# Patient Record
Sex: FEMALE | Race: WHITE | Employment: FULL TIME | URBAN - METROPOLITAN AREA
[De-identification: names, ages, dates, MRNs, and addresses within clinical notes are randomized per-mention and may not be internally consistent; named-entity substitution may affect disease eponyms.]

---

## 2017-01-06 ENCOUNTER — OFFICE VISIT (OUTPATIENT)
Dept: FAMILY MEDICINE | Facility: CLINIC | Age: 27
End: 2017-01-06
Payer: COMMERCIAL

## 2017-01-06 VITALS
BODY MASS INDEX: 20.73 KG/M2 | WEIGHT: 117 LBS | DIASTOLIC BLOOD PRESSURE: 75 MMHG | TEMPERATURE: 99.6 F | HEART RATE: 86 BPM | HEIGHT: 63 IN | SYSTOLIC BLOOD PRESSURE: 114 MMHG | RESPIRATION RATE: 14 BRPM

## 2017-01-06 DIAGNOSIS — B37.31 CANDIDIASIS OF VULVA AND VAGINA: Primary | ICD-10-CM

## 2017-01-06 DIAGNOSIS — N89.8 VAGINAL DISCHARGE: ICD-10-CM

## 2017-01-06 LAB
MICRO REPORT STATUS: NORMAL
SPECIMEN SOURCE: NORMAL
WET PREP SPEC: NORMAL

## 2017-01-06 PROCEDURE — 87210 SMEAR WET MOUNT SALINE/INK: CPT | Performed by: NURSE PRACTITIONER

## 2017-01-06 PROCEDURE — 99213 OFFICE O/P EST LOW 20 MIN: CPT | Performed by: NURSE PRACTITIONER

## 2017-01-06 RX ORDER — TERCONAZOLE 8 MG/G
1 CREAM VAGINAL AT BEDTIME
Qty: 20 G | Refills: 2 | Status: SHIPPED | OUTPATIENT
Start: 2017-01-06 | End: 2017-08-08

## 2017-01-06 NOTE — PROGRESS NOTES
SUBJECTIVE:                                                    Faustina Lubin is a 26 year old female who presents to clinic today for the following health issues:      Vaginal Symptoms      Duration: 1 week    Description  vaginal discharge - white clear, itching, burning and odor    Intensity:  moderate    Accompanying signs and symptoms (fever/dysuria/abdominal or back pain): None    History  Sexually active: yes, single partner, contraception - oral contraceptives (combined)  Possibility of pregnancy: No  Recent antibiotic use: no     Precipitating or alleviating factors: None    Therapies tried and outcome: none            Problem list and histories reviewed & adjusted, as indicated.    Additional history:   Past week has excess white vaginal discharge. Vagina is sore. No pelvic pains or urinary symptoms. Periods regular. Takes BCP. Lives with BF.   Had same symptoms 10/2016. Terazol cream worked well.       Patient Active Problem List   Diagnosis     Fibrocystic breast changes     CARDIOVASCULAR SCREENING; LDL GOAL LESS THAN 160     Headache     Cervicalgia     Mild single current episode of major depressive disorder (H)     Oral contraceptive pill surveillance     Past Surgical History   Procedure Laterality Date     No history of surgery         Social History   Substance Use Topics     Smoking status: Never Smoker      Smokeless tobacco: Never Used     Alcohol Use: 0.0 - 0.6 oz/week     0-1 Standard drinks or equivalent per week      Comment: less than one per week      Family History   Problem Relation Age of Onset     CANCER Maternal Grandmother      ovary and uterine, age 76     Hypertension Maternal Grandmother          Current Outpatient Prescriptions   Medication Sig Dispense Refill     terconazole (TERAZOL 3) 0.8 % cream Place 1 applicator (1 g) vaginally At Bedtime 20 g 2     JULEBER 0.15-30 MG-MCG per tablet TAKE ONE TABLET BY MOUTH ONCE DAILY 84 tablet 3     melatonin (MELATONIN) 1 MG/ML  "LIQD liquid Take 1 mg by mouth nightly as needed for sleep       Multiple Vitamins-Minerals (MULTIVITAMIN PO) Take by mouth daily         ROS:  C: NEGATIVE for fever, chills, change in weight  E/M: NEGATIVE for ear, mouth and throat problems  R: NEGATIVE for significant cough or SOB  CV: NEGATIVE for chest pain, palpitations or peripheral edema    OBJECTIVE:                                                    /75 mmHg  Pulse 86  Temp(Src) 99.6  F (37.6  C) (Tympanic)  Resp 14  Ht 5' 3\" (1.6 m)  Wt 117 lb (53.071 kg)  BMI 20.73 kg/m2  LMP 12/16/2016 (Approximate)  Body mass index is 20.73 kg/(m^2).   GENERAL: healthy, alert, well nourished, well hydrated, no distress  RESP: lungs clear to auscultation - no rales, no rhonchi, no wheezes  CV: regular rates and rhythm, normal S1 S2, no S3 or S4 and no murmur, no click or rub    ABDOMEN: soft, no tenderness, no  hepatosplenomegaly, no masses, normal bowel sounds  EXTERNAL GENITALIA: patchy red skin of labial tissues, no lesions.  VAGINA: small amount white discharge    Results for orders placed or performed in visit on 01/06/17   Wet prep   Result Value Ref Range    Specimen Description Vagina     Wet Prep       No clue cells seen  No Trichomonas seen  No yeast seen      Micro Report Status FINAL 01/06/2017               ASSESSMENT/PLAN:                                                        ICD-10-CM    1. Candidiasis of vulva and vagina B37.3    2. Vaginal discharge N89.8 Wet prep       Discussed condition and symptomatic cares. Feminine hygiene practices   I have discussed with patient the risks, benefits, medications, treatment options and modalities.  terconazole (TERAZOL 3) 0.8 % cream  Follow up if not improving as expected.      JORGE L Liu [facility]      "

## 2017-01-06 NOTE — NURSING NOTE
"Chief Complaint   Patient presents with     Vaginal Problem       Initial /75 mmHg  Pulse 86  Temp(Src) 99.6  F (37.6  C) (Tympanic)  Resp 14  Ht 5' 3\" (1.6 m)  Wt 117 lb (53.071 kg)  BMI 20.73 kg/m2  LMP 12/16/2016 (Approximate) Estimated body mass index is 20.73 kg/(m^2) as calculated from the following:    Height as of this encounter: 5' 3\" (1.6 m).    Weight as of this encounter: 117 lb (53.071 kg).  BP completed using cuff size: alok Vaca CMA      "

## 2017-01-06 NOTE — MR AVS SNAPSHOT
"              After Visit Summary   2017    Faustina Lubin    MRN: [id number]           Patient Information     Date Of Birth          1990        Visit Information        Provider Department      2017 4:00 PM Greer Chandler APRN CNP [facility]        Today's Diagnoses     Candidiasis of vulva and vagina    -  1     Vaginal discharge            Follow-ups after your visit        Who to contact     If you have questions or need follow up information about today's clinic visit or your schedule please contact [facility] directly at [phone].  Normal or non-critical lab and imaging results will be communicated to you by [employer], letter or phone within 4 business days after the clinic has received the results. If you do not hear from us within 7 days, please contact the clinic through [employer] or phone. If you have a critical or abnormal lab result, we will notify you by phone as soon as possible.  Submit refill requests through [employer] or call your pharmacy and they will forward the refill request to us. Please allow 3 business days for your refill to be completed.          Additional Information About Your Visit        MyChart Information     [employer] lets you send messages to your doctor, view your test results, renew your prescriptions, schedule appointments and more. To sign up, go to [email or web address] . Click on \"Log in\" on the left side of the screen, which will take you to the Welcome page. Then click on \"Sign up Now\" on the right side of the page.     You will be asked to enter the access code listed below, as well as some personal information. Please follow the directions to create your username and password.     Your access code is: [email or web address]  Expires: 2017  5:52 PM     Your access code will  in 90 days. If you need help or a new code, please call your [facility] or [phone].        Care EveryWhere ID     This is your Care " "EveryWhere ID. This could be used by other organizations to access your [facility] medical records  [id number]        Your Vitals Were     Pulse Temperature Respirations Height BMI (Body Mass Index) Last Period    86 99.6  F (37.6  C) (Tympanic) 14 5' 3\" (1.6 m) 20.73 kg/m2 12/16/2016 (Approximate)       Blood Pressure from Last 3 Encounters:   01/06/17 114/75   10/04/16 110/60   09/26/16 100/68    Weight from Last 3 Encounters:   01/06/17 117 lb (53.071 kg)   10/04/16 119 lb (53.978 kg)   09/26/16 117 lb (53.071 kg)              We Performed the Following     Wet prep          Today's Medication Changes          These changes are accurate as of: 1/6/17 11:59 PM.  If you have any questions, ask your nurse or doctor.               Start taking these medicines.        Dose/Directions    terconazole 0.8 % cream   Commonly known as:  TERAZOL 3   Started by:  Greer Chandler APRN CNP        Dose:  1 applicator   Place 1 applicator (1 g) vaginally At Bedtime   Quantity:  20 g   Refills:  2         Stop taking these medicines if you haven't already. Please contact your care team if you have questions.     buPROPion 150 MG 12 hr tablet   Commonly known as:  WELLBUTRIN SR   Stopped by:  Greer Chandler APRN CNP                Where to get your medicines      These medications were sent to [facility] [place] [address]     Phone:  [phone]    - terconazole 0.8 % cream             Primary Care Provider Office Phone # Fax #    JORGE L Liu [phone]       [address]  [place] [address]        Thank you!     Thank you for choosing [facility]  for your care. Our goal is always to provide you with excellent care. Hearing back from our patients is one way we can continue to improve our services. Please take a few minutes to complete the written survey that you may " receive in the mail after your visit with us. Thank you!             Your Updated Medication List - Protect others around you: Learn how to safely use, store and throw away your medicines at www.disposemymeds.org.          This list is accurate as of: 1/6/17 11:59 PM.  Always use your most recent med list.                   Brand Name Dispense Instructions for use    JULEBER 0.15-30 MG-MCG per tablet   Generic drug:  desogestrel-ethinyl estradiol     84 tablet    TAKE ONE TABLET BY MOUTH ONCE DAILY       melatonin 1 MG/ML Liqd liquid      Take 1 mg by mouth nightly as needed for sleep       MULTIVITAMIN PO      Take by mouth daily       terconazole 0.8 % cream    TERAZOL 3    20 g    Place 1 applicator (1 g) vaginally At Bedtime

## 2017-03-21 ENCOUNTER — TELEPHONE (OUTPATIENT)
Dept: FAMILY MEDICINE | Facility: CLINIC | Age: 27
End: 2017-03-21

## 2017-03-21 NOTE — TELEPHONE ENCOUNTER
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  States previously had some tests done and was found to have tachycardia.  Wondering if it is ok to proceed with training.  See echo and notes back from 2012 regarding.  She is unaware if she ever saw anyone from cardiology or what was determined fully.    Please advise,  Leilani Cowan RN  [employer] [employer]

## 2017-03-22 NOTE — TELEPHONE ENCOUNTER
Left detailed message with below information.    Stephanie Gonzalez RN  [facility]  [phone]

## 2017-03-22 NOTE — TELEPHONE ENCOUNTER
She should be able to do the tazer training.  She had normal EKG and heart ECHO.   Thr time her pulse was checked it was normal.

## 2017-08-08 ENCOUNTER — OFFICE VISIT (OUTPATIENT)
Dept: FAMILY MEDICINE | Facility: CLINIC | Age: 27
End: 2017-08-08
Payer: COMMERCIAL

## 2017-08-08 VITALS
SYSTOLIC BLOOD PRESSURE: 100 MMHG | HEIGHT: 63 IN | TEMPERATURE: 98.7 F | DIASTOLIC BLOOD PRESSURE: 60 MMHG | HEART RATE: 64 BPM | WEIGHT: 115 LBS | RESPIRATION RATE: 16 BRPM | OXYGEN SATURATION: 100 % | BODY MASS INDEX: 20.38 KG/M2

## 2017-08-08 DIAGNOSIS — R20.2 PARESTHESIAS: ICD-10-CM

## 2017-08-08 DIAGNOSIS — Z00.00 ENCOUNTER FOR ROUTINE ADULT HEALTH EXAMINATION WITHOUT ABNORMAL FINDINGS: Primary | ICD-10-CM

## 2017-08-08 DIAGNOSIS — Z30.41 ORAL CONTRACEPTIVE PILL SURVEILLANCE: ICD-10-CM

## 2017-08-08 PROCEDURE — 99395 PREV VISIT EST AGE 18-39: CPT | Performed by: PHYSICIAN ASSISTANT

## 2017-08-08 PROCEDURE — 99212 OFFICE O/P EST SF 10 MIN: CPT | Mod: 25 | Performed by: PHYSICIAN ASSISTANT

## 2017-08-08 RX ORDER — DESOGESTREL AND ETHINYL ESTRADIOL 0.15-0.03
1 KIT ORAL DAILY
Qty: 84 TABLET | Refills: 3 | Status: SHIPPED | OUTPATIENT
Start: 2017-08-08 | End: 2018-10-01

## 2017-08-08 NOTE — PROGRESS NOTES
"Chief Complaint   Patient presents with     Physical       Initial /60  Pulse 64  Temp 98.7  F (37.1  C)  Resp 16  Ht 5' 3\" (1.6 m)  Wt 115 lb (52.2 kg)  LMP 07/25/2017 (Approximate)  SpO2 100%  BMI 20.37 kg/m2 Estimated body mass index is 20.37 kg/(m^2) as calculated from the following:    Height as of this encounter: 5' 3\" (1.6 m).    Weight as of this encounter: 115 lb (52.2 kg).  Medication Reconciliation: complete. DAHIANA Heck LPN       SUBJECTIVE:   CC: Faustina Lubin is an 26 year old woman who presents for preventive health visit.     Healthy Habits:    Do you get at least three servings of calcium containing foods daily (dairy, green leafy vegetables, etc.)? yes    Amount of exercise or daily activities, outside of work: 1 day(s) per week    Problems taking medications regularly No    Medication side effects: No    Have you had an eye exam in the past two years? yes    Do you see a dentist twice per year? yes    Do you have sleep apnea, excessive snoring or daytime drowsiness?no    Occasional right side of breast soreness. Unsure if related to period?  Both tips of toes have occasional tingling and a little sore. 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       -------------------------------------    Today's PHQ-2 Score: PHQ-2 ( 1999 Pfizer) 8/8/2017 1/6/2017   Q1: Little interest or pleasure in doing things 0 0   Q2: Feeling down, depressed or hopeless 0 0   PHQ-2 Score 0 0         Abuse: Current or Past(Physical, Sexual or Emotional)- No  Do you feel safe in your environment - Yes  Social History   Substance Use Topics     Smoking status: Never Smoker     Smokeless tobacco: Never Used     Alcohol use 0.0 - 0.6 oz/week     0 - 1 Standard drinks or equivalent per week      Comment: less than one per week      The patient does not drink >3 drinks per day nor >7 drinks per week.    Reviewed orders with patient.  Reviewed health maintenance and updated orders " accordingly - Yes            Labs reviewed in EPIC  Patient Active Problem List   Diagnosis     Fibrocystic breast changes     CARDIOVASCULAR SCREENING; LDL GOAL LESS THAN 160     Headache     Cervicalgia     Mild single current episode of major depressive disorder (H)     Oral contraceptive pill surveillance     Past Surgical History:   Procedure Laterality Date     NO HISTORY OF SURGERY         Social History   Substance Use Topics     Smoking status: Never Smoker     Smokeless tobacco: Never Used     Alcohol use 0.0 - 0.6 oz/week     0 - 1 Standard drinks or equivalent per week      Comment: less than one per week      Family History   Problem Relation Age of Onset     CANCER Maternal Grandmother      ovary and uterine, age 76     Hypertension Maternal Grandmother      DIABETES Paternal Grandfather      Breast Cancer No family hx of      Colon Cancer No family hx of      Myocardial Infarction No family hx of          Current Outpatient Prescriptions   Medication Sig Dispense Refill     desogestrel-ethinyl estradiol (JULEBER) 0.15-30 MG-MCG per tablet Take 1 tablet by mouth daily 84 tablet 3     Multiple Vitamins-Minerals (MULTIVITAMIN PO) Take by mouth daily       [DISCONTINUED] JULEBER 0.15-30 MG-MCG per tablet TAKE ONE TABLET BY MOUTH ONCE DAILY 84 tablet 3     Allergies   Allergen Reactions     No Known Drug Allergies          Mammogram not appropriate for this patient based on age.    Pertinent mammograms are reviewed under the imaging tab.  History of abnormal Pap smear: NO - age 21-29 PAP every 3 years recommended    Reviewed and updated as needed this visit by clinical staffTobacco  Allergies  Meds  Fam Hx  Soc Hx        Reviewed and updated as needed this visit by Provider              ROS:  C: NEGATIVE for fever, chills, change in weight  I: NEGATIVE for worrisome rashes, moles or lesions  E: NEGATIVE for vision changes or irritation  ENT: NEGATIVE for ear, mouth and throat problems  R: NEGATIVE for  "significant cough or SOB  B: NEGATIVE for masses, tenderness or discharge  CV: NEGATIVE for chest pain, palpitations or peripheral edema  GI: NEGATIVE for nausea, abdominal pain, heartburn, or change in bowel habits  : NEGATIVE for unusual urinary or vaginal symptoms. Periods are regular.  M: NEGATIVE for significant arthralgias or myalgia  N: see above.   P: NEGATIVE for changes in mood or affect    OBJECTIVE:   /60  Pulse 64  Temp 98.7  F (37.1  C)  Resp 16  Ht 5' 3\" (1.6 m)  Wt 115 lb (52.2 kg)  LMP 07/25/2017 (Approximate)  SpO2 100%  BMI 20.37 kg/m2  EXAM:  GENERAL: healthy, alert and no distress  EYES: Eyes grossly normal to inspection, PERRL and conjunctivae and sclerae normal  HENT: ear canals and TM's normal, nose and mouth without ulcers or lesions  NECK: no adenopathy, no asymmetry, masses, or scars and thyroid normal to palpation  RESP: lungs clear to auscultation - no rales, rhonchi or wheezes  BREAST: normal without masses, tenderness or nipple discharge and no palpable axillary masses or adenopathy  CV: regular rate and rhythm, normal S1 S2, no S3 or S4, no murmur, click or rub, no peripheral edema and peripheral pulses strong  ABDOMEN: soft, nontender, no hepatosplenomegaly, no masses and bowel sounds normal  MS: no gross musculoskeletal defects noted, no edema  SKIN: no suspicious lesions or rashes  NEURO: Normal strength and tone, mentation intact and speech normal  PSYCH: mentation appears normal, affect normal/bright    ASSESSMENT/PLAN:   Faustina was seen today for physical.    Diagnoses and all orders for this visit:    Encounter for routine adult health examination without abnormal findings  Declines labs.     Oral contraceptive pill surveillance  -     desogestrel-ethinyl estradiol (JULEBER) 0.15-30 MG-MCG per tablet; Take 1 tablet by mouth daily    Paresthesias  Exam normal. Able to detect light and sharp sensation.  Likely from pressure from her boots. Says they are part of " "her uniform, so she can't change them, will ask though.   Recommend trying different socks, lacing looser, mole skin padding.   Return if worsening.     Breast exam normal. Recommend monitoring and symptom diary.     COUNSELING:   Reviewed preventive health counseling, as reflected in patient instructions       Regular exercise       Healthy diet/nutrition       Vision screening       Contraception       Family planning       Safe sex practices/STD prevention    BP Screening:   Last 3 BP Readings:    BP Readings from Last 3 Encounters:   08/08/17 100/60   01/06/17 114/75   10/04/16 110/60       The following was recommended to the patient:  Re-screen BP within a year and recommended lifestyle modifications     reports that she has never smoked. She has never used smokeless tobacco.    Estimated body mass index is 20.37 kg/(m^2) as calculated from the following:    Height as of this encounter: 5' 3\" (1.6 m).    Weight as of this encounter: 115 lb (52.2 kg).         Counseling Resources:  ATP IV Guidelines  Pooled Cohorts Equation Calculator  Breast Cancer Risk Calculator  FRAX Risk Assessment  ICSI Preventive Guidelines  Dietary Guidelines for Americans, 2010  USDA's MyPlate  ASA Prophylaxis  Lung CA Screening    Analy Oro PA-C  [facility] [place]  "

## 2017-08-08 NOTE — MR AVS SNAPSHOT
After Visit Summary   8/8/2017    Faustina Lubin    MRN: [id number]           Patient Information     Date Of Birth          1990        Visit Information        Provider Department      8/8/2017 11:20 AM Analy Oro PA-C [facility] [place]        Today's Diagnoses     Oral contraceptive pill surveillance          Care Instructions      Preventive Health Recommendations  Female Ages 18 to 25     Yearly exam:     See your health care provider every year in order to  o Review health changes.   o Discuss preventive care.    o Review your medicines if your doctor has prescribed any.      You should be tested each year for STDs (sexually transmitted diseases).       After age 20, talk to your provider about how often you should have cholesterol testing.      Starting at age 21, get a Pap test every three years. If you have an abnormal result, your doctor may have you test more often.      If you are at risk for diabetes, you should have a diabetes test (fasting glucose).     Shots:     Get a flu shot each year.     Get a tetanus shot every 10 years.     Consider getting the shot (vaccine) that prevents cervical cancer (Gardasil).    Nutrition:     Eat at least 5 servings of fruits and vegetables each day.    Eat whole-grain bread, whole-wheat pasta and brown rice instead of white grains and rice.    Talk to your provider about Calcium and Vitamin D.     Lifestyle    Exercise at least 150 minutes a week each week (30 minutes a day, 5 days a week). This will help you control your weight and prevent disease.    Limit alcohol to one drink per day.    No smoking.     Wear sunscreen to prevent skin cancer.    See your dentist every six months for an exam and cleaning.          Follow-ups after your visit        Who to contact     If you have questions or need follow up information about today's clinic visit or your schedule please contact [facility] [place] directly at  "[phone].  Normal or non-critical lab and imaging results will be communicated to you by MyChart, letter or phone within 4 business days after the clinic has received the results. If you do not hear from us within 7 days, please contact the clinic through [employer] or phone. If you have a critical or abnormal lab result, we will notify you by phone as soon as possible.  Submit refill requests through [employer] or call your pharmacy and they will forward the refill request to us. Please allow 3 business days for your refill to be completed.          Additional Information About Your Visit        [employer] Information     [employer] lets you send messages to your doctor, view your test results, renew your prescriptions, schedule appointments and more. To sign up, go to [email or web address] . Click on \"Log in\" on the left side of the screen, which will take you to the Welcome page. Then click on \"Sign up Now\" on the right side of the page.     You will be asked to enter the access code listed below, as well as some personal information. Please follow the directions to create your username and password.     Your access code is: [email or web address]  Expires: 2017 11:50 AM     Your access code will  in 90 days. If you need help or a new code, please call your [facility] clinic or [phone].        Care EveryWhere ID     This is your Care EveryWhere ID. This could be used by other organizations to access your [facility] medical records  [id number]        Your Vitals Were     Pulse Temperature Respirations Height Last Period Pulse Oximetry    64 98.7  F (37.1  C) 16 5' 3\" (1.6 m) 2017 (Approximate) 100%    BMI (Body Mass Index)                   20.37 kg/m2            Blood Pressure from Last 3 Encounters:   17 100/60   17 114/75   10/04/16 110/60    Weight from Last 3 Encounters:   17 115 lb (52.2 kg)   17 117 lb (53.1 kg)   10/04/16 119 lb (54 kg)              Today, you had the following  "    No orders found for display         Today's Medication Changes          These changes are accurate as of: 8/8/17 11:50 AM.  If you have any questions, ask your nurse or doctor.               These medicines have changed or have updated prescriptions.        Dose/Directions    desogestrel-ethinyl estradiol 0.15-30 MG-MCG per tablet   Commonly known as:  LIANNE   This may have changed:  See the new instructions.   Used for:  Oral contraceptive pill surveillance   Changed by:  Analy Oro PA-C        Dose:  1 tablet   Take 1 tablet by mouth daily   Quantity:  84 tablet   Refills:  3            Where to get your medicines      These medications were sent to [id number], [place] - [address]   [address] [place] [address], [facility] [address]     Phone:  [phone]     desogestrel-ethinyl estradiol 0.15-30 MG-MCG per tablet                Primary Care Provider Office Phone # Fax #    Greer Chandler, JORGE L [phone]       [address]  [place] [address]        Equal Access to Services     [facility] AH: Hadii aad ku hadasho Soomaali, waaxda luqadaha, qaybta kaalmada adeegyada, waxay idiin hayaan [place] sauerarajaquan lacortes ah. So [facility] [phone].    ATENCIÓN: Si habla español, tiene a lopez disposición servicios gratuitos de asistencia lingüística. Ema al [phone].    We comply with applicable federal civil rights laws and [place] laws. We do not discriminate on the basis of race, color, national origin, age, disability sex, sexual orientation or gender identity.            Thank you!     Thank you for choosing [facility] [place]  for your care. Our goal is always to provide you with excellent care. Hearing back from our patients is one way we can continue to improve our services. Please take a few minutes to complete the written survey that you may receive in the mail after your visit with us. Thank  you!             Your Updated Medication List - Protect others around you: Learn how to safely use, store and throw away your medicines at www.disposemymeds.org.          This list is accurate as of: 8/8/17 11:50 AM.  Always use your most recent med list.                   Brand Name Dispense Instructions for use Diagnosis    desogestrel-ethinyl estradiol 0.15-30 MG-MCG per tablet    JULEBER    84 tablet    Take 1 tablet by mouth daily    Oral contraceptive pill surveillance       MULTIVITAMIN PO      Take by mouth daily

## 2017-11-06 DIAGNOSIS — Z30.41 ORAL CONTRACEPTIVE PILL SURVEILLANCE: ICD-10-CM

## 2017-11-06 RX ORDER — DESOGESTREL AND ETHINYL ESTRADIOL 0.15-0.03
1 KIT ORAL DAILY
Qty: 84 TABLET | Refills: 3 | Status: CANCELLED | OUTPATIENT
Start: 2017-11-06

## 2017-12-11 ENCOUNTER — OFFICE VISIT (OUTPATIENT)
Dept: FAMILY MEDICINE | Facility: CLINIC | Age: 27
End: 2017-12-11
Payer: COMMERCIAL

## 2017-12-11 VITALS
WEIGHT: 118.4 LBS | RESPIRATION RATE: 14 BRPM | HEART RATE: 76 BPM | OXYGEN SATURATION: 98 % | BODY MASS INDEX: 20.98 KG/M2 | TEMPERATURE: 99 F | DIASTOLIC BLOOD PRESSURE: 63 MMHG | HEIGHT: 63 IN | SYSTOLIC BLOOD PRESSURE: 101 MMHG

## 2017-12-11 DIAGNOSIS — N89.8 VAGINAL DISCHARGE: Primary | ICD-10-CM

## 2017-12-11 DIAGNOSIS — M79.674 PAIN OF TOE OF RIGHT FOOT: ICD-10-CM

## 2017-12-11 LAB
SPECIMEN SOURCE: NORMAL
WET PREP SPEC: NORMAL

## 2017-12-11 PROCEDURE — 87491 CHLMYD TRACH DNA AMP PROBE: CPT | Performed by: PHYSICIAN ASSISTANT

## 2017-12-11 PROCEDURE — 87591 N.GONORRHOEAE DNA AMP PROB: CPT | Performed by: PHYSICIAN ASSISTANT

## 2017-12-11 PROCEDURE — 99214 OFFICE O/P EST MOD 30 MIN: CPT | Performed by: PHYSICIAN ASSISTANT

## 2017-12-11 PROCEDURE — 87210 SMEAR WET MOUNT SALINE/INK: CPT | Performed by: PHYSICIAN ASSISTANT

## 2017-12-11 NOTE — PROGRESS NOTES
SUBJECTIVE:   Faustina Lubin is a 27 year old female who presents to clinic today for the following health issues:    Vaginal Symptoms  Onset: About a month ago    Description:  Vaginal Discharge: white creamy   Itching (Pruritis): no   Burning sensation:  YES  Odor: YES    Accompanying Signs & Symptoms:  Pain with Urination: no   Abdominal Pain: no   Fever: YES- low grade    History:   Sexually active: YES  New Partner: YES  Possibility of Pregnancy:  No    Precipitating factors:   Recent Antibiotic Use: no     Alleviating factors:  Nothing     Therapies Tried and outcome: OTC cream monistat      Additional concerns: STD testing      Right great toe pain x 6 months:  Some redness and numbness at the lateral nailfold of her right great toe x 6 months.  She started a job at that time and has to wear boots everyday for work.  She notices the pain with walking and standing, pain is better with rest.  No drainage, swelling or spreading erythema    Problem list and histories reviewed & adjusted, as indicated.  Additional history: as documented    Patient Active Problem List   Diagnosis     Fibrocystic breast changes     CARDIOVASCULAR SCREENING; LDL GOAL LESS THAN 160     Headache     Cervicalgia     Mild single current episode of major depressive disorder (H)     Oral contraceptive pill surveillance     Past Surgical History:   Procedure Laterality Date     NO HISTORY OF SURGERY         Social History   Substance Use Topics     Smoking status: Never Smoker     Smokeless tobacco: Never Used     Alcohol use 0.0 - 0.6 oz/week     0 - 1 Standard drinks or equivalent per week      Comment: less than one per week      Family History   Problem Relation Age of Onset     CANCER Maternal Grandmother      ovary and uterine, age 76     Hypertension Maternal Grandmother      DIABETES Paternal Grandfather      Breast Cancer No family hx of      Colon Cancer No family hx of      Myocardial Infarction No family hx of       "    Current Outpatient Prescriptions   Medication Sig Dispense Refill     desogestrel-ethinyl estradiol (JULEBER) 0.15-30 MG-MCG per tablet Take 1 tablet by mouth daily 84 tablet 3     Multiple Vitamins-Minerals (MULTIVITAMIN PO) Take by mouth daily       Allergies   Allergen Reactions     No Known Drug Allergies          Reviewed and updated as needed this visit by clinical staff     Reviewed and updated as needed this visit by Provider         ROS:  Constitutional, HEENT, cardiovascular, pulmonary, gi and gu systems are negative, except as otherwise noted.      OBJECTIVE:   /63 (BP Location: Right arm, Patient Position: Chair, Cuff Size: Adult Regular)  Pulse 76  Temp 99  F (37.2  C) (Tympanic)  Resp 14  Ht 5' 3\" (1.6 m)  Wt 118 lb 6.4 oz (53.7 kg)  LMP 11/11/2017  SpO2 98%  BMI 20.97 kg/m2  Body mass index is 20.97 kg/(m^2).  GENERAL: healthy, alert and no distress   (female): normal female external genitalia, normal urethral meatus, vaginal mucosa, normal cervix/adnexa/uterus, small amount of whitish d/c noted at vaginal introidus  MS: no gross musculoskeletal defects noted, no edema, mild TTP and of lateral nailfold of right great toe, no swelling noted, no ingrown nail visualized, sensation subjectively decreased focally at lateral nailfold  PSYCH: mentation appears normal, affect normal/bright    Diagnostic Test Results:  Results for orders placed or performed in visit on 12/11/17 (from the past 24 hour(s))   Wet prep   Result Value Ref Range    Specimen Description Vagina     Wet Prep No Trichomonas seen     Wet Prep No clue cells seen     Wet Prep No yeast seen        ASSESSMENT/PLAN:             1. Vaginal discharge  Wet prep negative today, on exam there is small amount of whitish d/c at vaginal opening, will await STD testing.  Advise sitz baths, probiotics and fluid intake to help with symptoms.  Discussed how change in d/c may be hormonal as well.  - Wet prep  - Neisseria gonorrhoeae " PCR  - Chlamydia trachomatis PCR      2. Pain of toe of right foot  Mild redness at right lateral nailfold, no swelling, nail not noticeably ingrown.  Advised that she get wider toed shoes, and perform warm foot soaks to combat symptoms.      See Patient Instructions    Isauro Hatfield PA-C  [facility]

## 2017-12-11 NOTE — NURSING NOTE
"Chief Complaint   Patient presents with     Vaginal Problem     Musculoskeletal Problem     rt great toe pain       Initial /63 (BP Location: Right arm, Patient Position: Chair, Cuff Size: Adult Regular)  Pulse 76  Temp 99  F (37.2  C) (Tympanic)  Resp 14  Ht 5' 3\" (1.6 m)  Wt 118 lb 6.4 oz (53.7 kg)  LMP 11/11/2017  SpO2 98%  BMI 20.97 kg/m2 Estimated body mass index is 20.97 kg/(m^2) as calculated from the following:    Height as of this encounter: 5' 3\" (1.6 m).    Weight as of this encounter: 118 lb 6.4 oz (53.7 kg).  Medication Reconciliation: complete    Isabel López MA  "

## 2017-12-11 NOTE — MR AVS SNAPSHOT
"              After Visit Summary   2017    Faustina Lubin    MRN: [id number]           Patient Information     Date Of Birth          1990        Visit Information        Provider Department      2017 4:00 PM Isauro Hatfield PA-C [facility]        Today's Diagnoses     Vaginal discharge    -  1       Follow-ups after your visit        Follow-up notes from your care team     Return if symptoms worsen or fail to improve.      Who to contact     If you have questions or need follow up information about today's clinic visit or your schedule please contact [facility] directly at [phone].  Normal or non-critical lab and imaging results will be communicated to you by MyChart, letter or phone within 4 business days after the clinic has received the results. If you do not hear from us within 7 days, please contact the clinic through MyChart or phone. If you have a critical or abnormal lab result, we will notify you by phone as soon as possible.  Submit refill requests through [employer] or call your pharmacy and they will forward the refill request to us. Please allow 3 business days for your refill to be completed.          Additional Information About Your Visit        MyChart Information     [employer] lets you send messages to your doctor, view your test results, renew your prescriptions, schedule appointments and more. To sign up, go to [email or web address] . Click on \"Log in\" on the left side of the screen, which will take you to the Welcome page. Then click on \"Sign up Now\" on the right side of the page.     You will be asked to enter the access code listed below, as well as some personal information. Please follow the directions to create your username and password.     Your access code is: [email or web address]  Expires: 3/11/2018  4:36 PM     Your access code will  in 90 days. If you need help or a new code, please call your [facility] or " "[phone].        Care EveryWhere ID     This is your Care EveryWhere ID. This could be used by other organizations to access your [facility] medical records  [id number]        Your Vitals Were     Pulse Temperature Respirations Height Last Period Pulse Oximetry    76 99  F (37.2  C) (Tympanic) 14 5' 3\" (1.6 m) 11/11/2017 98%    BMI (Body Mass Index)                   20.97 kg/m2            Blood Pressure from Last 3 Encounters:   12/11/17 101/63   08/08/17 100/60   01/06/17 114/75    Weight from Last 3 Encounters:   12/11/17 118 lb 6.4 oz (53.7 kg)   08/08/17 115 lb (52.2 kg)   01/06/17 117 lb (53.1 kg)              We Performed the Following     Chlamydia trachomatis PCR     Neisseria gonorrhoeae PCR     Wet prep        Primary Care Provider Office Phone # Fax #    Analy Oro PA-C [phone]       XXX RESIGNED XXX  [place] [address]        Equal Access to Services     [facility]: Hadii aad ku hadasho Soomaali, waaxda luqadaha, qaybta kaalmada adeisaiah, kim castellanos . So [facility] [phone].    ATENCIÓN: Si habla español, tiene a lopez disposición servicios gratuitos de asistencia lingüística. [facility] [phone].    We comply with applicable federal civil rights laws and [place] laws. We do not discriminate on the basis of race, color, national origin, age, disability, sex, sexual orientation, or gender identity.            Thank you!     Thank you for choosing [facility] [place]  for your care. Our goal is always to provide you with excellent care. Hearing back from our patients is one way we can continue to improve our services. Please take a few minutes to complete the written survey that you may receive in the mail after your visit with us. Thank you!             Your Updated Medication List - Protect others around you: Learn how to safely use, store and throw away your medicines at www.disposemymeds.org.          This list is " accurate as of: 12/11/17  4:39 PM.  Always use your most recent med list.                   Brand Name Dispense Instructions for use Diagnosis    desogestrel-ethinyl estradiol 0.15-30 MG-MCG per tablet    JULEBER    84 tablet    Take 1 tablet by mouth daily    Oral contraceptive pill surveillance       MULTIVITAMIN PO      Take by mouth daily

## 2017-12-11 NOTE — LETTER
December 15, 2017      Faustina Lubin  [address] [place] [address]        Dear ,    We are writing to inform you of your test results.      -Chlamydia and gonnohrea tests are normal.    Resulted Orders   Wet prep   Result Value Ref Range    Specimen Description Vagina     Wet Prep No Trichomonas seen     Wet Prep No clue cells seen     Wet Prep No yeast seen    Neisseria gonorrhoeae PCR   Result Value Ref Range    Specimen Descrip Urine     N Gonorrhea PCR Negative NEG^Negative      Comment:      Negative for N. gonorrhoeae rRNA by transcription mediated amplification.  A negative result by transcription mediated amplification does not preclude   the presence of N. gonorrhoeae infection because results are dependent on   proper and adequate collection, absence of inhibitors, and sufficient rRNA to   be detected.     Chlamydia trachomatis PCR   Result Value Ref Range    Specimen Description Urine     Chlamydia Trachomatis PCR Negative NEG^Negative      Comment:      Negative for C. trachomatis rRNA by transcription mediated amplification.  A negative result by transcription mediated amplification does not preclude   the presence of C. trachomatis infection because results are dependent on   proper and adequate collection, absence of inhibitors, and sufficient rRNA to   be detected.         If you have any questions or concerns, please call the clinic at the number listed above.       Sincerely,        Isauro Hatfield PA-C

## 2017-12-13 ENCOUNTER — TELEPHONE (OUTPATIENT)
Dept: FAMILY MEDICINE | Facility: CLINIC | Age: 27
End: 2017-12-13

## 2017-12-13 NOTE — TELEPHONE ENCOUNTER
Spoke with patient and advised labs are still in process. We will call once they are completed.   Yolanda Lawson RN   [facility] - Triage

## 2017-12-13 NOTE — TELEPHONE ENCOUNTER
Patient was seen 12/11/17 with Isauro Hatfield, was told she would receive a call re: test results and if any medication would be prescribed. Please advise.  [phone] (home)   Thank you  Khadra Goss

## 2017-12-14 LAB
C TRACH DNA SPEC QL NAA+PROBE: NEGATIVE
N GONORRHOEA DNA SPEC QL NAA+PROBE: NEGATIVE
SPECIMEN SOURCE: NORMAL
SPECIMEN SOURCE: NORMAL

## 2017-12-15 NOTE — PROGRESS NOTES
Letter sent to patient with results.    Isauro Hatfield PA-C  [facility]

## 2018-03-01 ENCOUNTER — OFFICE VISIT (OUTPATIENT)
Dept: FAMILY MEDICINE | Facility: CLINIC | Age: 28
End: 2018-03-01
Payer: COMMERCIAL

## 2018-03-01 VITALS
HEIGHT: 64 IN | HEART RATE: 89 BPM | DIASTOLIC BLOOD PRESSURE: 72 MMHG | OXYGEN SATURATION: 100 % | BODY MASS INDEX: 19.97 KG/M2 | WEIGHT: 117 LBS | SYSTOLIC BLOOD PRESSURE: 109 MMHG | TEMPERATURE: 99 F

## 2018-03-01 DIAGNOSIS — B37.31 YEAST INFECTION OF THE VAGINA: ICD-10-CM

## 2018-03-01 DIAGNOSIS — F32.0 MILD SINGLE CURRENT EPISODE OF MAJOR DEPRESSIVE DISORDER (H): ICD-10-CM

## 2018-03-01 DIAGNOSIS — R10.2 VAGINAL PAIN: Primary | ICD-10-CM

## 2018-03-01 LAB
ALBUMIN UR-MCNC: NEGATIVE MG/DL
APPEARANCE UR: CLEAR
BILIRUB UR QL STRIP: NEGATIVE
COLOR UR AUTO: YELLOW
GLUCOSE UR STRIP-MCNC: NEGATIVE MG/DL
HGB UR QL STRIP: NEGATIVE
KETONES UR STRIP-MCNC: NEGATIVE MG/DL
LEUKOCYTE ESTERASE UR QL STRIP: NEGATIVE
NITRATE UR QL: NEGATIVE
PH UR STRIP: 7 PH (ref 5–7)
SOURCE: NORMAL
SP GR UR STRIP: 1.02 (ref 1–1.03)
SPECIMEN SOURCE: ABNORMAL
UROBILINOGEN UR STRIP-ACNC: 0.2 EU/DL (ref 0.2–1)
WET PREP SPEC: ABNORMAL

## 2018-03-01 PROCEDURE — 99213 OFFICE O/P EST LOW 20 MIN: CPT | Performed by: PHYSICIAN ASSISTANT

## 2018-03-01 PROCEDURE — 87210 SMEAR WET MOUNT SALINE/INK: CPT | Performed by: PHYSICIAN ASSISTANT

## 2018-03-01 PROCEDURE — 87591 N.GONORRHOEAE DNA AMP PROB: CPT | Performed by: PHYSICIAN ASSISTANT

## 2018-03-01 PROCEDURE — 87491 CHLMYD TRACH DNA AMP PROBE: CPT | Performed by: PHYSICIAN ASSISTANT

## 2018-03-01 PROCEDURE — 81003 URINALYSIS AUTO W/O SCOPE: CPT | Performed by: PHYSICIAN ASSISTANT

## 2018-03-01 RX ORDER — FLUCONAZOLE 150 MG/1
TABLET ORAL
Qty: 2 TABLET | Refills: 0 | Status: SHIPPED | OUTPATIENT
Start: 2018-03-01 | End: 2018-06-12

## 2018-03-01 NOTE — MR AVS SNAPSHOT
"              After Visit Summary   3/1/2018    Faustina Lubin    MRN: [id number]           Patient Information     Date Of Birth          1990        Visit Information        Provider Department      3/1/2018 8:40 AM Isauro Hatfield PA-C [facility]        Today's Diagnoses     Vaginal pain    -  1    Yeast infection of the vagina           Follow-ups after your visit        Follow-up notes from your care team     Return 1-2 weeks if symptoms persistent.      Who to contact     If you have questions or need follow up information about today's clinic visit or your schedule please contact [facility] directly at [phone].  Normal or non-critical lab and imaging results will be communicated to you by MyChart, letter or phone within 4 business days after the clinic has received the results. If you do not hear from us within 7 days, please contact the clinic through MyChart or phone. If you have a critical or abnormal lab result, we will notify you by phone as soon as possible.  Submit refill requests through [employer] or call your pharmacy and they will forward the refill request to us. Please allow 3 business days for your refill to be completed.          Additional Information About Your Visit        MyChart Information     [employer] lets you send messages to your doctor, view your test results, renew your prescriptions, schedule appointments and more. To sign up, go to [email or web address] . Click on \"Log in\" on the left side of the screen, which will take you to the Welcome page. Then click on \"Sign up Now\" on the right side of the page.     You will be asked to enter the access code listed below, as well as some personal information. Please follow the directions to create your username and password.     Your access code is: [email or web address]  Expires: 3/11/2018  4:36 PM     Your access code will  in 90 days. If you need help or a new code, please call your " "[facility] or [phone].        Care EveryWhere ID     This is your Care EveryWhere ID. This could be used by other organizations to access your [facility] medical records  [id number]        Your Vitals Were     Pulse Temperature Height Last Period Pulse Oximetry BMI (Body Mass Index)    89 99  F (37.2  C) (Oral) 5' 3.78\" (1.62 m) 02/06/2018 100% 20.22 kg/m2       Blood Pressure from Last 3 Encounters:   03/01/18 109/72   12/11/17 101/63   08/08/17 100/60    Weight from Last 3 Encounters:   03/01/18 117 lb (53.1 kg)   12/11/17 118 lb 6.4 oz (53.7 kg)   08/08/17 115 lb (52.2 kg)              We Performed the Following     *UA reflex to Microscopic and Culture ([place] [facility] (except [place] and [place])     Chlamydia trachomatis PCR     Neisseria gonorrhoeae PCR     Wet prep          Today's Medication Changes          These changes are accurate as of 3/1/18  9:08 AM.  If you have any questions, ask your nurse or doctor.               Start taking these medicines.        Dose/Directions    fluconazole 150 MG tablet   Commonly known as:  DIFLUCAN   Used for:  Yeast infection of the vagina   Started by:  Isauro Hatfield PA-C        Take 1 tablet by mouth x 1, repeat in 1 week if needed   Quantity:  2 tablet   Refills:  0            Where to get your medicines      These medications were sent to [facility] [address] [address]     Phone:  [phone]     fluconazole 150 MG tablet                Primary Care Provider    None Specified       No primary provider on file.        Equal Access to Services     [facility]: Hadii pranay corbin Sojosh, waaxda luqadaha, qaybta kaalmada adeegyada, kim smith. So [facility] [phone].    ATENCIÓN: Si habla español, tiene a lopez disposición servicios gratuitos de asistencia lingüística. Llame al [phone].    We comply with " applicable federal civil rights laws and [place] laws. We do not discriminate on the basis of race, color, national origin, age, disability, sex, sexual orientation, or gender identity.            Thank you!     Thank you for choosing [facility] [place]  for your care. Our goal is always to provide you with excellent care. Hearing back from our patients is one way we can continue to improve our services. Please take a few minutes to complete the written survey that you may receive in the mail after your visit with us. Thank you!             Your Updated Medication List - Protect others around you: Learn how to safely use, store and throw away your medicines at www.disposemymeds.org.          This list is accurate as of 3/1/18  9:08 AM.  Always use your most recent med list.                   Brand Name Dispense Instructions for use Diagnosis    desogestrel-ethinyl estradiol 0.15-30 MG-MCG per tablet    JULEBER    84 tablet    Take 1 tablet by mouth daily    Oral contraceptive pill surveillance       fluconazole 150 MG tablet    DIFLUCAN    2 tablet    Take 1 tablet by mouth x 1, repeat in 1 week if needed    Yeast infection of the vagina       MULTIVITAMIN PO      Take by mouth daily

## 2018-03-01 NOTE — PROGRESS NOTES
SUBJECTIVE:   Faustina Lubin is a 27 year old female who presents to clinic today for the following health issues:      Vaginal Symptoms  Onset: one week     Description:  Vaginal Discharge: white   Itching (Pruritis): YES  Burning sensation:  YES  Odor: YES    Accompanying Signs & Symptoms:  Pain with Urination: no   Abdominal Pain: no   Fever: no     History:   Sexually active: YES  New Partner: YES last 2 months   Possibility of Pregnancy:  No    Precipitating factors:   Recent Antibiotic Use: no     Alleviating factors:      Therapies Tried and outcome: none       1 week hx of vaginal irritation and burning.  She also has been experiencing some pain and small amount of bleeding with intercourse last week.           Problem list and histories reviewed & adjusted, as indicated.  Additional history: as documented    Patient Active Problem List   Diagnosis     Fibrocystic breast changes     CARDIOVASCULAR SCREENING; LDL GOAL LESS THAN 160     Headache     Cervicalgia     Mild single current episode of major depressive disorder (H)     Oral contraceptive pill surveillance     Past Surgical History:   Procedure Laterality Date     NO HISTORY OF SURGERY         Social History   Substance Use Topics     Smoking status: Never Smoker     Smokeless tobacco: Never Used     Alcohol use 0.0 - 0.6 oz/week     0 - 1 Standard drinks or equivalent per week      Comment: less than one per week      Family History   Problem Relation Age of Onset     CANCER Maternal Grandmother      ovary and uterine, age 76     Hypertension Maternal Grandmother      DIABETES Paternal Grandfather      Breast Cancer No family hx of      Colon Cancer No family hx of      Myocardial Infarction No family hx of          Current Outpatient Prescriptions   Medication Sig Dispense Refill     fluconazole (DIFLUCAN) 150 MG tablet Take 1 tablet by mouth x 1, repeat in 1 week if needed 2 tablet 0     desogestrel-ethinyl estradiol (JULEBER) 0.15-30 MG-MCG  "per tablet Take 1 tablet by mouth daily 84 tablet 3     Multiple Vitamins-Minerals (MULTIVITAMIN PO) Take by mouth daily       Allergies   Allergen Reactions     No Known Drug Allergies        Reviewed and updated as needed this visit by clinical staff       Reviewed and updated as needed this visit by Provider         ROS:  Constitutional, HEENT, cardiovascular, pulmonary, gi and gu systems are negative, except as otherwise noted.    OBJECTIVE:     /72  Pulse 89  Temp 99  F (37.2  C) (Oral)  Ht 5' 3.78\" (1.62 m)  Wt 117 lb (53.1 kg)  LMP 02/06/2018  SpO2 100%  BMI 20.22 kg/m2  Body mass index is 20.22 kg/(m^2).  GENERAL: healthy, alert and no distress  ABDOMEN: soft, nontender, no hepatosplenomegaly, no masses and bowel sounds normal  ; self wet prep done  PSYCH: mentation appears normal, affect normal/bright    Diagnostic Test Results:  Results for orders placed or performed in visit on 03/01/18 (from the past 24 hour(s))   Wet prep   Result Value Ref Range    Specimen Description Vagina     Wet Prep No clue cells seen     Wet Prep No Trichomonas seen     Wet Prep Yeast seen (A)    *UA reflex to Microscopic and Culture ([place] and [facility] (except [place] and [place])   Result Value Ref Range    Color Urine Yellow     Appearance Urine Clear     Glucose Urine Negative NEG^Negative mg/dL    Bilirubin Urine Negative NEG^Negative    Ketones Urine Negative NEG^Negative mg/dL    Specific Gravity Urine 1.020 1.003 - 1.035    Blood Urine Negative NEG^Negative    pH Urine 7.0 5.0 - 7.0 pH    Protein Albumin Urine Negative NEG^Negative mg/dL    Urobilinogen Urine 0.2 0.2 - 1.0 EU/dL    Nitrite Urine Negative NEG^Negative    Leukocyte Esterase Urine Negative NEG^Negative    Source Midstream Urine        ASSESSMENT/PLAN:     1. Yeast infection of the vagina  - fluconazole (DIFLUCAN) 150 MG tablet; Take 1 tablet by mouth x 1, repeat in 1 week if needed  Dispense: 2 tablet; Refill: 0    2. Mild single " current episode of major depressive disorder (H)  controlled    3. Vaginal pain  - Wet prep  - *UA reflex to Microscopic and Culture ([place] and [facility] (except [place] and [place])  - Chlamydia trachomatis PCR  - Neisseria gonorrhoeae PCR    See Patient Instructions    Isauro Hatfield PA-C  [facility]

## 2018-03-05 NOTE — PROGRESS NOTES
Letter sent to patient with results.    Isauro Hatfield PA-C  [facility]

## 2018-05-07 ENCOUNTER — OFFICE VISIT (OUTPATIENT)
Dept: FAMILY MEDICINE | Facility: CLINIC | Age: 28
End: 2018-05-07
Payer: COMMERCIAL

## 2018-05-07 DIAGNOSIS — B37.31 YEAST INFECTION OF THE VAGINA: ICD-10-CM

## 2018-05-07 DIAGNOSIS — N89.8 VAGINAL DISCHARGE: Primary | ICD-10-CM

## 2018-05-07 LAB
SPECIMEN SOURCE: ABNORMAL
WET PREP SPEC: ABNORMAL

## 2018-05-07 PROCEDURE — 87210 SMEAR WET MOUNT SALINE/INK: CPT | Performed by: PHYSICIAN ASSISTANT

## 2018-05-07 PROCEDURE — 99213 OFFICE O/P EST LOW 20 MIN: CPT | Performed by: PHYSICIAN ASSISTANT

## 2018-05-07 RX ORDER — FLUCONAZOLE 150 MG/1
150 TABLET ORAL
Qty: 4 TABLET | Refills: 0 | Status: SHIPPED | OUTPATIENT
Start: 2018-05-07 | End: 2018-06-12

## 2018-05-07 NOTE — PROGRESS NOTES
SUBJECTIVE:   Faustina Lubin is a 27 year old female who presents to clinic today for the following health issues:    Vaginal Symptoms  Onset: x 1 week or so    Description:Pt was in March 1st 2018 for same sx. Pt reported sx doesn't seem to have completely gone away.   Vaginal Discharge: creamy   Itching (Pruritis): YES  Burning sensation:  YES  Odor: YES    Accompanying Signs & Symptoms:  Pain with Urination: no   Abdominal Pain: no   Fever: NO    History:   Sexually active: YES  New Partner: no   Possibility of Pregnancy:  No    Precipitating factors:   Recent Antibiotic Use: no     Alleviating factors:  Nothing    Therapies Tried and outcome: Nothing     1 week hx of worsening vaginal itching and whitish discharge.  No concerns for STDs today. Treated with diflucan x 2 03/1/2018 with some but not complete improvement        Problem list and histories reviewed & adjusted, as indicated.  Additional history: as documented    Patient Active Problem List   Diagnosis     Fibrocystic breast changes     CARDIOVASCULAR SCREENING; LDL GOAL LESS THAN 160     Headache     Cervicalgia     Mild single current episode of major depressive disorder (H)     Oral contraceptive pill surveillance     Past Surgical History:   Procedure Laterality Date     NO HISTORY OF SURGERY         Social History   Substance Use Topics     Smoking status: Never Smoker     Smokeless tobacco: Never Used     Alcohol use 0.0 - 0.6 oz/week     0 - 1 Standard drinks or equivalent per week      Comment: less than one per week      Family History   Problem Relation Age of Onset     CANCER Maternal Grandmother      ovary and uterine, age 76     Hypertension Maternal Grandmother      DIABETES Paternal Grandfather      Breast Cancer No family hx of      Colon Cancer No family hx of      Myocardial Infarction No family hx of          Current Outpatient Prescriptions   Medication Sig Dispense Refill     fluconazole (DIFLUCAN) 150 MG tablet Take 1 tablet  (150 mg) by mouth every 3 days 4 tablet 0     desogestrel-ethinyl estradiol (JULEBER) 0.15-30 MG-MCG per tablet Take 1 tablet by mouth daily 84 tablet 3     fluconazole (DIFLUCAN) 150 MG tablet Take 1 tablet by mouth x 1, repeat in 1 week if needed (Patient not taking: Reported on 5/7/2018) 2 tablet 0     Multiple Vitamins-Minerals (MULTIVITAMIN PO) Take by mouth daily       Allergies   Allergen Reactions     No Known Drug Allergies        Reviewed and updated as needed this visit by clinical staff       Reviewed and updated as needed this visit by Provider         ROS:  Constitutional, HEENT, cardiovascular, pulmonary, gi and gu systems are negative, except as otherwise noted.    OBJECTIVE:     There were no vitals taken for this visit.  There is no height or weight on file to calculate BMI.  GENERAL: healthy, alert and no distress   (female): deferred, self wet prep collected  PSYCH: mentation appears normal, affect normal/bright    Diagnostic Test Results:  Results for orders placed or performed in visit on 05/07/18 (from the past 24 hour(s))   Wet prep   Result Value Ref Range    Specimen Description Vagina     Wet Prep No clue cells seen     Wet Prep No Trichomonas seen     Wet Prep Yeast seen (A)        ASSESSMENT/PLAN:       1. Yeast infection of the vagina  Will treat yeast with diflucan 150 every 3 days for 12 days, she will return if symptoms persist  - fluconazole (DIFLUCAN) 150 MG tablet; Take 1 tablet (150 mg) by mouth every 3 days  Dispense: 4 tablet; Refill: 0    2. Vaginal discharge  - Wet prep    See Patient Instructions    Isauro Hatfield PA-C  [facility]

## 2018-05-07 NOTE — MR AVS SNAPSHOT
"              After Visit Summary   2018    Faustina Lubin    MRN: [id number]           Patient Information     Date Of Birth          1990        Visit Information        Provider Department      2018 11:40 AM Isauro Hatfield PA-C [facility]        Today's Diagnoses     Vaginal discharge    -  1    Yeast infection of the vagina           Follow-ups after your visit        Follow-up notes from your care team     Return in about 1 month (around 2018) for if symptoms persist.      Who to contact     If you have questions or need follow up information about today's clinic visit or your schedule please contact [facility] directly at [phone].  Normal or non-critical lab and imaging results will be communicated to you by [employer], letter or phone within 4 business days after the clinic has received the results. If you do not hear from us within 7 days, please contact the clinic through [employer] or phone. If you have a critical or abnormal lab result, we will notify you by phone as soon as possible.  Submit refill requests through [employer] or call your pharmacy and they will forward the refill request to us. Please allow 3 business days for your refill to be completed.          Additional Information About Your Visit        MyChart Information     [employer] lets you send messages to your doctor, view your test results, renew your prescriptions, schedule appointments and more. To sign up, go to [email or web address] . Click on \"Log in\" on the left side of the screen, which will take you to the Welcome page. Then click on \"Sign up Now\" on the right side of the page.     You will be asked to enter the access code listed below, as well as some personal information. Please follow the directions to create your username and password.     Your access code is: [email or web address]  Expires: 2018 12:11 PM     Your access code will  in 90 days. If you need help or " a new code, please call your [place] clinic or [phone].        Care EveryWhere ID     This is your Care EveryWhere ID. This could be used by other organizations to access your [place] medical records  [id number]         Blood Pressure from Last 3 Encounters:   03/01/18 109/72   12/11/17 101/63   08/08/17 100/60    Weight from Last 3 Encounters:   03/01/18 117 lb (53.1 kg)   12/11/17 118 lb 6.4 oz (53.7 kg)   08/08/17 115 lb (52.2 kg)              We Performed the Following     Wet prep          Today's Medication Changes          These changes are accurate as of 5/7/18 12:11 PM.  If you have any questions, ask your nurse or doctor.               These medicines have changed or have updated prescriptions.        Dose/Directions    * fluconazole 150 MG tablet   Commonly known as:  DIFLUCAN   This may have changed:  Another medication with the same name was added. Make sure you understand how and when to take each.   Used for:  Yeast infection of the vagina        Take 1 tablet by mouth x 1, repeat in 1 week if needed   Quantity:  2 tablet   Refills:  0       * fluconazole 150 MG tablet   Commonly known as:  DIFLUCAN   This may have changed:  You were already taking a medication with the same name, and this prescription was added. Make sure you understand how and when to take each.   Used for:  Yeast infection of the vagina        Dose:  150 mg   Take 1 tablet (150 mg) by mouth every 3 days   Quantity:  4 tablet   Refills:  0       * Notice:  This list has 2 medication(s) that are the same as other medications prescribed for you. Read the directions carefully, and ask your doctor or other care provider to review them with you.         Where to get your medicines      These medications were sent to [facility] [place] - [place] - [address]  [address], [place] [address]     Phone:  [phone]     fluconazole 150 MG tablet                Primary Care  Provider    None Specified       No primary provider on file.        Equal Access to Services     ANA STILES : Hadii aad ku haddallasramírez Nicole, wakelechimartha herring, rosibelkim cortez. So [facility] [phone].    ATENCIÓN: Si habla español, tiene a lopez disposición servicios gratuitos de asistencia lingüística. Llame al [phone].    We comply with applicable federal civil rights laws and [place] laws. We do not discriminate on the basis of race, color, national origin, age, disability, sex, sexual orientation, or gender identity.            Thank you!     Thank you for choosing [facility]  for your care. Our goal is always to provide you with excellent care. Hearing back from our patients is one way we can continue to improve our services. Please take a few minutes to complete the written survey that you may receive in the mail after your visit with us. Thank you!             Your Updated Medication List - Protect others around you: Learn how to safely use, store and throw away your medicines at www.disposemymeds.org.          This list is accurate as of 5/7/18 12:11 PM.  Always use your most recent med list.                   Brand Name Dispense Instructions for use Diagnosis    desogestrel-ethinyl estradiol 0.15-30 MG-MCG per tablet    JULEBER    84 tablet    Take 1 tablet by mouth daily    Oral contraceptive pill surveillance       * fluconazole 150 MG tablet    DIFLUCAN    2 tablet    Take 1 tablet by mouth x 1, repeat in 1 week if needed    Yeast infection of the vagina       * fluconazole 150 MG tablet    DIFLUCAN    4 tablet    Take 1 tablet (150 mg) by mouth every 3 days    Yeast infection of the vagina       MULTIVITAMIN PO      Take by mouth daily        * Notice:  This list has 2 medication(s) that are the same as other medications prescribed for you. Read the directions carefully, and ask your doctor or other care provider to review them  with you.

## 2018-06-12 ENCOUNTER — OFFICE VISIT (OUTPATIENT)
Dept: FAMILY MEDICINE | Facility: CLINIC | Age: 28
End: 2018-06-12
Payer: COMMERCIAL

## 2018-06-12 VITALS
WEIGHT: 123 LBS | HEART RATE: 94 BPM | TEMPERATURE: 99.7 F | SYSTOLIC BLOOD PRESSURE: 110 MMHG | HEIGHT: 64 IN | BODY MASS INDEX: 21 KG/M2 | DIASTOLIC BLOOD PRESSURE: 70 MMHG | OXYGEN SATURATION: 94 % | RESPIRATION RATE: 18 BRPM

## 2018-06-12 DIAGNOSIS — N92.6 MISSED PERIOD: Primary | ICD-10-CM

## 2018-06-12 DIAGNOSIS — R10.13 ABDOMINAL PAIN, EPIGASTRIC: ICD-10-CM

## 2018-06-12 DIAGNOSIS — N89.8 VAGINAL ITCHING: ICD-10-CM

## 2018-06-12 LAB
BETA HCG QUAL IFA URINE: NEGATIVE
SPECIMEN SOURCE: NORMAL
WET PREP SPEC: NORMAL

## 2018-06-12 PROCEDURE — 99214 OFFICE O/P EST MOD 30 MIN: CPT | Performed by: PHYSICIAN ASSISTANT

## 2018-06-12 PROCEDURE — 84703 CHORIONIC GONADOTROPIN ASSAY: CPT | Performed by: PHYSICIAN ASSISTANT

## 2018-06-12 PROCEDURE — 87210 SMEAR WET MOUNT SALINE/INK: CPT | Performed by: PHYSICIAN ASSISTANT

## 2018-06-12 ASSESSMENT — ANXIETY QUESTIONNAIRES
IF YOU CHECKED OFF ANY PROBLEMS ON THIS QUESTIONNAIRE, HOW DIFFICULT HAVE THESE PROBLEMS MADE IT FOR YOU TO DO YOUR WORK, TAKE CARE OF THINGS AT HOME, OR GET ALONG WITH OTHER PEOPLE: NOT DIFFICULT AT ALL
6. BECOMING EASILY ANNOYED OR IRRITABLE: SEVERAL DAYS
7. FEELING AFRAID AS IF SOMETHING AWFUL MIGHT HAPPEN: NOT AT ALL
3. WORRYING TOO MUCH ABOUT DIFFERENT THINGS: SEVERAL DAYS
5. BEING SO RESTLESS THAT IT IS HARD TO SIT STILL: NOT AT ALL
2. NOT BEING ABLE TO STOP OR CONTROL WORRYING: SEVERAL DAYS
GAD7 TOTAL SCORE: 4
1. FEELING NERVOUS, ANXIOUS, OR ON EDGE: SEVERAL DAYS

## 2018-06-12 ASSESSMENT — PATIENT HEALTH QUESTIONNAIRE - PHQ9: 5. POOR APPETITE OR OVEREATING: NOT AT ALL

## 2018-06-12 NOTE — PROGRESS NOTES
SUBJECTIVE:   Faustina Lubin is a 27 year old female who presents to clinic today for the following health issues:    ABDOMINAL PAIN     Onset: x 1 week    Description:   Character: Dull ache, cramping   Location: epigastric region  Radiation: None    Intensity: moderate    Progression of Symptoms:  intermittent    Accompanying Signs & Symptoms:  Fever/Chills?: no   Gas/Bloating: no   Nausea: no   Vomitting: no   Diarrhea?: YES  Constipation:no   Dysuria or Hematuria: no    History:   Trauma: no   Previous similar pain: YES   Previous tests done: Upper Endoscopy about 5 years ago    Precipitating factors:   Does the pain change with:     Food: YES- usually after pt eats     BM: no     Urination: no     Alleviating factors:      Therapies Tried and outcome: Ibuprofen helps sometimes    LMP:  05/01/2018       Intermittent epigastric discomfort over the past 1 week, seems to worsen slightly after eating, lasts a few hours, no specific food trigger.  No f/c, n/v, 2 isolated episodes of diarrhea this week    Vaginal Symptoms  Duration of complaint: x 1 week vaginal itching and burning , no discharge or concerns for STDs      Missed period:  Patient is taking OCPs consistently.  During last cycle she did not have any bleeding while on her placebo pills, would like a pregnancy test today.  She is currently on week 2 of active pills.  No hx of irregularity    Problem list and histories reviewed & adjusted, as indicated.  Additional history:     Patient Active Problem List   Diagnosis     Fibrocystic breast changes     CARDIOVASCULAR SCREENING; LDL GOAL LESS THAN 160     Headache     Cervicalgia     Mild single current episode of major depressive disorder (H)     Oral contraceptive pill surveillance     Past Surgical History:   Procedure Laterality Date     NO HISTORY OF SURGERY         Social History   Substance Use Topics     Smoking status: Never Smoker     Smokeless tobacco: Never Used     Alcohol use 0.0 - 0.6  "oz/week     0 - 1 Standard drinks or equivalent per week      Comment: less than one per week      Family History   Problem Relation Age of Onset     CANCER Maternal Grandmother      ovary and uterine, age 76     Hypertension Maternal Grandmother      DIABETES Paternal Grandfather      Breast Cancer No family hx of      Colon Cancer No family hx of      Myocardial Infarction No family hx of          Current Outpatient Prescriptions   Medication Sig Dispense Refill     desogestrel-ethinyl estradiol (JULEBER) 0.15-30 MG-MCG per tablet Take 1 tablet by mouth daily 84 tablet 3     Multiple Vitamins-Minerals (MULTIVITAMIN PO) Take by mouth daily       Allergies   Allergen Reactions     No Known Drug Allergies        Reviewed and updated as needed this visit by clinical staff       Reviewed and updated as needed this visit by Provider         ROS:  Constitutional, HEENT, cardiovascular, pulmonary, GI, , musculoskeletal, neuro, skin, endocrine and psych systems are negative, except as otherwise noted.    OBJECTIVE:     /70  Pulse 94  Temp 99.7  F (37.6  C) (Tympanic)  Resp 18  Ht 5' 3.78\" (1.62 m)  Wt 123 lb (55.8 kg)  LMP 05/01/2018 (Approximate)  SpO2 94%  BMI 21.26 kg/m2  Body mass index is 21.26 kg/(m^2).  GENERAL: healthy, alert and no distress  RESP: lungs clear to auscultation - no rales, rhonchi or wheezes  CV: regular rate and rhythm, normal S1 S2, no S3 or S4, no murmur, click or rub  ABDOMEN: soft, mild epigastric TTP, no rebound or guarding, no hepatosplenomegaly, no masses and bowel sounds normal   (female): deferred, self wet prep  PSYCH: mentation appears normal, affect normal/bright    Diagnostic Test Results:  none     ASSESSMENT/PLAN:       1. Abdominal pain, epigastric  Etiology unclear, possible Gerd vs gastritis, with intermittent diarrhea could be viral in origin.  Advise bland diet and trial of zantac consistently x 2 weeks    2. Vaginal itching  Wet prep negative, advise warm water " baths, if symptoms persist she will return for retesting  - Wet prep    3. Missed period  HCG negative today, advise that she may continue her pill, can take at home test or return for retesting she does not have her normal cycle during next round of placebo pills  - Beta HCG Qual, Urine - [facility] [place] ([id number])    See Patient Instructions    Isauro Hatfield PA-C  [facility]

## 2018-06-12 NOTE — MR AVS SNAPSHOT
"              After Visit Summary   6/12/2018    Faustina Lubin    MRN: [id number]           Patient Information     Date Of Birth          1990        Visit Information        Provider Department      6/12/2018 10:00 AM Isauro Hatfield PA-C [facility] [place]        Today's Diagnoses     Missed period    -  1    Abdominal pain, epigastric        Vaginal itching          Care Instructions    Zantac 75 mg twice daily           Follow-ups after your visit        Follow-up notes from your care team     Return in about 2 weeks (around 6/26/2018) for if persistent or worsening symptoms.      Who to contact     If you have questions or need follow up information about today's clinic visit or your schedule please contact [facility] [place] directly at [phone].  Normal or non-critical lab and imaging results will be communicated to you by MyChart, letter or phone within 4 business days after the clinic has received the results. If you do not hear from us within 7 days, please contact the clinic through MyChart or phone. If you have a critical or abnormal lab result, we will notify you by phone as soon as possible.  Submit refill requests through [employer] or call your pharmacy and they will forward the refill request to us. Please allow 3 business days for your refill to be completed.          Additional Information About Your Visit        Care EveryWhere ID     This is your Care EveryWhere ID. This could be used by other organizations to access your [place] medical records  [id number]        Your Vitals Were     Pulse Temperature Respirations Height Last Period Pulse Oximetry    94 99.7  F (37.6  C) (Tympanic) 18 5' 3.78\" (1.62 m) 05/01/2018 (Approximate) 94%    BMI (Body Mass Index)                   21.26 kg/m2            Blood Pressure from Last 3 Encounters:   06/12/18 110/70   03/01/18 109/72   12/11/17 101/63    Weight from Last 3 Encounters:   06/12/18 123 lb (55.8 kg) "   03/01/18 117 lb (53.1 kg)   12/11/17 118 lb 6.4 oz (53.7 kg)              We Performed the Following     Beta HCG Qual, Urine - [facility] [place] ([id number])     Wet prep        Primary Care Provider Office Phone # Fax #    [facility] [phone]       [address] [address]        Equal Access to Services     ANA STILES : Hadii aad ku hadasho Soomaali, waaxda luqadaha, qaybta kaalmada adeegyada, waxay idiin hayaan adeeg kharajaquan la'john . So [facility] [phone].    ATENCIÓN: Si habla español, tiene a lopez disposición servicios gratuitos de asistencia lingüística. Llame al [phone].    We comply with applicable federal civil rights laws and [place] laws. We do not discriminate on the basis of race, color, national origin, age, disability, sex, sexual orientation, or gender identity.            Thank you!     Thank you for choosing [facility]  for your care. Our goal is always to provide you with excellent care. Hearing back from our patients is one way we can continue to improve our services. Please take a few minutes to complete the written survey that you may receive in the mail after your visit with us. Thank you!             Your Updated Medication List - Protect others around you: Learn how to safely use, store and throw away your medicines at www.disposemymeds.org.          This list is accurate as of 6/12/18 10:51 AM.  Always use your most recent med list.                   Brand Name Dispense Instructions for use Diagnosis    desogestrel-ethinyl estradiol 0.15-30 MG-MCG per tablet    JULEBER    84 tablet    Take 1 tablet by mouth daily    Oral contraceptive pill surveillance       MULTIVITAMIN PO      Take by mouth daily

## 2018-06-13 ASSESSMENT — ANXIETY QUESTIONNAIRES: GAD7 TOTAL SCORE: 4

## 2018-06-13 ASSESSMENT — PATIENT HEALTH QUESTIONNAIRE - PHQ9: SUM OF ALL RESPONSES TO PHQ QUESTIONS 1-9: 6

## 2018-07-17 ENCOUNTER — OFFICE VISIT (OUTPATIENT)
Dept: FAMILY MEDICINE | Facility: CLINIC | Age: 28
End: 2018-07-17
Payer: COMMERCIAL

## 2018-07-17 VITALS
DIASTOLIC BLOOD PRESSURE: 67 MMHG | WEIGHT: 123 LBS | BODY MASS INDEX: 21.26 KG/M2 | SYSTOLIC BLOOD PRESSURE: 102 MMHG | TEMPERATURE: 98.6 F | OXYGEN SATURATION: 98 % | HEART RATE: 98 BPM

## 2018-07-17 DIAGNOSIS — L91.8 SKIN TAG: Primary | ICD-10-CM

## 2018-07-17 DIAGNOSIS — D23.5 BENIGN NEOPLASM OF SKIN OF TRUNK, EXCEPT SCROTUM: ICD-10-CM

## 2018-07-17 PROCEDURE — 17110 DESTRUCTION B9 LES UP TO 14: CPT | Performed by: INTERNAL MEDICINE

## 2018-07-17 PROCEDURE — 99212 OFFICE O/P EST SF 10 MIN: CPT | Mod: 25 | Performed by: INTERNAL MEDICINE

## 2018-07-17 NOTE — MR AVS SNAPSHOT
After Visit Summary   7/17/2018    Faustina Lubin    MRN: [id number]           Patient Information     Date Of Birth          1990        Visit Information        Provider Department      7/17/2018 1:20 PM Analy Monzon [facility]        Today's Diagnoses     Skin tag    -  1    Benign neoplasm of skin of trunk, except scrotum           Follow-ups after your visit        Who to contact     If you have questions or need follow up information about today's clinic visit or your schedule please contact [facility] [place] directly at [phone].  Normal or non-critical lab and imaging results will be communicated to you by MyChart, letter or phone within 4 business days after the clinic has received the results. If you do not hear from us within 7 days, please contact the clinic through MyChart or phone. If you have a critical or abnormal lab result, we will notify you by phone as soon as possible.  Submit refill requests through [employer] or call your pharmacy and they will forward the refill request to us. Please allow 3 business days for your refill to be completed.          Additional Information About Your Visit        Care EveryWhere ID     This is your Care EveryWhere ID. This could be used by other organizations to access your [place] medical records  [id number]        Your Vitals Were     Pulse Temperature Last Period Pulse Oximetry BMI (Body Mass Index)       98 98.6  F (37  C) (Oral) 06/26/2018 98% 21.26 kg/m2        Blood Pressure from Last 3 Encounters:   07/17/18 102/67   06/12/18 110/70   03/01/18 109/72    Weight from Last 3 Encounters:   07/17/18 123 lb (55.8 kg)   06/12/18 123 lb (55.8 kg)   03/01/18 117 lb (53.1 kg)              We Performed the Following     DESTRUCT BENIGN LESION, UP TO 14        Primary Care Provider Office Phone # Fax #    [facility] [phone] [address]  [place] [address]        Equal Access to Services     [facility] Center[facility] : Hadii aad ku haddallasramírez Emmettali, wakelechida luqnatalieha, qakarrita kamarijakim jose. So [facility] [phone].    ATENCIÓN: Si habla español, tiene a lopez disposición servicios gratuitos de asistencia lingüística. Llame al [phone].    We comply with applicable federal civil rights laws and [place] laws. We do not discriminate on the basis of race, color, national origin, age, disability, sex, sexual orientation, or gender identity.            Thank you!     Thank you for choosing [facility] [place]  for your care. Our goal is always to provide you with excellent care. Hearing back from our patients is one way we can continue to improve our services. Please take a few minutes to complete the written survey that you may receive in the mail after your visit with us. Thank you!             Your Updated Medication List - Protect others around you: Learn how to safely use, store and throw away your medicines at www.disposemymeds.org.          This list is accurate as of 7/17/18  1:58 PM.  Always use your most recent med list.                   Brand Name Dispense Instructions for use Diagnosis    desogestrel-ethinyl estradiol 0.15-30 MG-MCG per tablet    JULEBER    84 tablet    Take 1 tablet by mouth daily    Oral contraceptive pill surveillance

## 2018-07-17 NOTE — PROGRESS NOTES
SUBJECTIVE:   Faustina Lubin is a 27 year old female who presents to clinic today for the following health issues:      Concern - sore on bikini line   Onset: 2 weeks     Description:   Sore on bikini line     Intensity: mild    Progression of Symptoms:  worsening    Accompanying Signs & Symptoms:  Mole on abdomen area, mole is changing in size and color     Previous history of similar problem:   Yes     Precipitating factors:   Worsened by:     Alleviating factors:  Improved by:     Therapies Tried and outcome:   \Problem list and histories reviewed & adjusted, as indicated.  Additional history: as documented    Patient Active Problem List   Diagnosis     Fibrocystic breast changes     CARDIOVASCULAR SCREENING; LDL GOAL LESS THAN 160     Headache     Cervicalgia     Mild single current episode of major depressive disorder (H)     Oral contraceptive pill surveillance     Past Surgical History:   Procedure Laterality Date     NO HISTORY OF SURGERY         Social History   Substance Use Topics     Smoking status: Never Smoker     Smokeless tobacco: Never Used     Alcohol use 0.0 - 0.6 oz/week     0 - 1 Standard drinks or equivalent per week      Comment: less than one per week      Family History   Problem Relation Age of Onset     Cancer Maternal Grandmother      ovary and uterine, age 76     Hypertension Maternal Grandmother      Diabetes Paternal Grandfather      Breast Cancer No family hx of      Colon Cancer No family hx of      Myocardial Infarction No family hx of            Reviewed and updated as needed this visit by clinical staff       Reviewed and updated as needed this visit by Provider         ROS:  Constitutional, HEENT, cardiovascular, pulmonary, gi and gu systems are negative, except as otherwise noted.    OBJECTIVE:     /67 (BP Location: Left arm, Cuff Size: Adult Regular)  Pulse 98  Temp 98.6  F (37  C) (Oral)  Wt 123 lb (55.8 kg)  LMP 06/26/2018  SpO2 98%  BMI 21.26 kg/m2  Body  mass index is 21.26 kg/(m^2).  GENERAL: healthy, alert and no distress  SKIN: Small skin tag along right groin/bikini line, tiny 2 mm benign nevus noted on abdomen (homogenous in color, symmetric with regular borders).    After discussing the risks, benefits and alternatives to cryotherapy the patient elected to proceed with this procedure.  The skin tag was frozen with liquid nitrogen x 3.  The patient tolerated this procedure well and there were no immediate adverse effects.  Aftercare was reviewed with the patient in detail.      ASSESSMENT/PLAN:       1. Skin tag  Frozen today with liquid nitrogen.     2. Benign neoplasm of skin of trunk, except scrotum  Reassured Faustina that the mole of concern appears benign. Recommending sunscreen application and monitoring for changes.    Follow up PRN.    Analy Monzon MD  [facility] [place]

## 2018-10-30 ENCOUNTER — OFFICE VISIT (OUTPATIENT)
Dept: FAMILY MEDICINE | Facility: CLINIC | Age: 28
End: 2018-10-30
Payer: COMMERCIAL

## 2018-10-30 VITALS
OXYGEN SATURATION: 98 % | HEART RATE: 96 BPM | DIASTOLIC BLOOD PRESSURE: 68 MMHG | BODY MASS INDEX: 21.95 KG/M2 | SYSTOLIC BLOOD PRESSURE: 104 MMHG | WEIGHT: 127 LBS | TEMPERATURE: 97.9 F

## 2018-10-30 DIAGNOSIS — Z30.41 ORAL CONTRACEPTIVE PILL SURVEILLANCE: ICD-10-CM

## 2018-10-30 DIAGNOSIS — Z00.00 ROUTINE HISTORY AND PHYSICAL EXAMINATION OF ADULT: Primary | ICD-10-CM

## 2018-10-30 PROCEDURE — G0124 SCREEN C/V THIN LAYER BY MD: HCPCS | Performed by: INTERNAL MEDICINE

## 2018-10-30 PROCEDURE — G0145 SCR C/V CYTO,THINLAYER,RESCR: HCPCS | Performed by: INTERNAL MEDICINE

## 2018-10-30 PROCEDURE — 87624 HPV HI-RISK TYP POOLED RSLT: CPT | Performed by: INTERNAL MEDICINE

## 2018-10-30 PROCEDURE — 99395 PREV VISIT EST AGE 18-39: CPT | Performed by: INTERNAL MEDICINE

## 2018-10-30 RX ORDER — DESOGESTREL AND ETHINYL ESTRADIOL 0.15-0.03
1 KIT ORAL DAILY
Qty: 84 TABLET | Refills: 3 | Status: SHIPPED | OUTPATIENT
Start: 2018-10-30 | End: 2019-10-02

## 2018-10-30 NOTE — PROGRESS NOTES
SUBJECTIVE:   CC: Faustina Lubin is an 27 year old woman who presents for preventive health visit.     Healthy Habits:    Do you get at least three servings of calcium containing foods daily (dairy, green leafy vegetables, etc.)? o     Amount of exercise or daily activities, outside of work: every other week     Problems taking medications regularly No    Medication side effects: No    Have you had an eye exam in the past two years? yes    Do you see a dentist twice per year? yes    Do you have sleep apnea, excessive snoring or daytime drowsiness?no    Takes her OCP daily and 2 months ago did not get a period. Last month only bled for 1 day and it was very light. Due for her next period in a week. No other symptoms.     Today's PHQ-2 Score:   PHQ-2 ( 1999 Pf[employer]) 6/12/2018 12/11/2017   Q1: Little interest or pleasure in doing things 0 0   Q2: Feeling down, depressed or hopeless 0 0   PHQ-2 Score 0 0       Abuse: Current or Past(Physical, Sexual or Emotional)- No  Do you feel safe in your environment - Yes    Social History   Substance Use Topics     Smoking status: Never Smoker     Smokeless tobacco: Never Used     Alcohol use 0.0 - 0.6 oz/week     0 - 1 Standard drinks or equivalent per week      Comment: less than one per week      If you drink alcohol do you typically have >3 drinks per day or >7 drinks per week? No                     Reviewed orders with patient.  Reviewed health maintenance and updated orders accordingly - Yes  BP Readings from Last 3 Encounters:   10/30/18 104/68   07/17/18 102/67   06/12/18 110/70    Wt Readings from Last 3 Encounters:   10/30/18 127 lb (57.6 kg)   07/17/18 123 lb (55.8 kg)   06/12/18 123 lb (55.8 kg)                    Mammogram not appropriate for this patient based on age.    Pertinent mammograms are reviewed under the imaging tab.  History of abnormal Pap smear: NO - age 21-29 PAP every 3 years recommended  PAP / HPV 9/10/2015 12/31/2012   PAP NIL NIL      Reviewed and updated as needed this visit by clinical staff  Tobacco  Allergies  Meds         Reviewed and updated as needed this visit by Provider            ROS:  CONSTITUTIONAL: NEGATIVE for fever, chills, change in weight  INTEGUMENTARU/SKIN: NEGATIVE for worrisome rashes, moles or lesions  EYES: NEGATIVE for vision changes or irritation  ENT: NEGATIVE for ear, mouth and throat problems  RESP: NEGATIVE for significant cough or SOB  BREAST: NEGATIVE for masses, tenderness or discharge  CV: NEGATIVE for chest pain, palpitations or peripheral edema  GI: NEGATIVE for nausea, abdominal pain, heartburn, or change in bowel habits  : NEGATIVE for unusual urinary or vaginal symptoms. Periods are regular.  MUSCULOSKELETAL: NEGATIVE for significant arthralgias or myalgia  NEURO: NEGATIVE for weakness, dizziness or paresthesias  PSYCHIATRIC: NEGATIVE for changes in mood or affect    OBJECTIVE:   /68  Pulse 96  Temp 97.9  F (36.6  C) (Oral)  Wt 127 lb (57.6 kg)  LMP 10/16/2018  SpO2 98%  BMI 21.95 kg/m2  EXAM:  GENERAL: healthy, alert and no distress  EYES: Eyes grossly normal to inspection, PERRL and conjunctivae and sclerae normal  HENT: ear canals and TM's normal, nose and mouth without ulcers or lesions  NECK: no adenopathy, no asymmetry, masses, or scars and thyroid normal to palpation  RESP: lungs clear to auscultation - no rales, rhonchi or wheezes  BREAST: normal without masses, tenderness or nipple discharge and no palpable axillary masses or adenopathy  CV: regular rate and rhythm, normal S1 S2, no S3 or S4, no murmur, click or rub, no peripheral edema and peripheral pulses strong  ABDOMEN: soft, nontender, no hepatosplenomegaly, no masses and bowel sounds normal   (female): normal female external genitalia, normal urethral meatus, vaginal mucosa pink, moist, well rugated, and normal cervix/adnexa/uterus without masses or discharge  MS: no gross musculoskeletal defects noted, no edema  SKIN:  "no suspicious lesions or rashes  NEURO: Normal strength and tone, mentation intact and speech normal  PSYCH: mentation appears normal, affect normal/bright    Diagnostic Test Results:  none     ASSESSMENT/PLAN:   1. Routine history and physical examination of adult  - PAP imaged thin layer screen reflex to HPV if ASCUS - recommended age 25 - 29 years    2. Oral contraceptive pill surveillance  - desogestrel-ethinyl estradiol (JULEBER) 0.15-30 MG-MCG per tablet; Take 1 tablet by mouth daily  Dispense: 84 tablet; Refill: 3    COUNSELING:   Reviewed preventive health counseling, as reflected in patient instructions       Regular exercise       Healthy diet/nutrition       Contraception    BP Readings from Last 1 Encounters:   10/30/18 104/68     Estimated body mass index is 21.95 kg/(m^2) as calculated from the following:    Height as of 6/12/18: 5' 3.78\" (1.62 m).    Weight as of this encounter: 127 lb (57.6 kg).           reports that she has never smoked. She has never used smokeless tobacco.      Counseling Resources:  ATP IV Guidelines  Pooled Cohorts Equation Calculator  Breast Cancer Risk Calculator  FRAX Risk Assessment  ICSI Preventive Guidelines  Dietary Guidelines for Americans, 2010  [employer]'s MyPlate  ASA Prophylaxis  Lung CA Screening    Analy Monzon MD  [facility]  "

## 2018-10-30 NOTE — LETTER
After Visit Summary   12/4/2017    Quiana Dunaway    MRN: [id number]           Patient Information     Date Of Birth          1935        Visit Information        Provider Department      12/4/2017 1:30 PM  INFUSION CHAIR 9 [facility]        Today's Diagnoses     Multiple myeloma not having achieved remission (H)    -  1       Follow-ups after your visit        Your next 10 appointments already scheduled     Dec 06, 2017  2:00 PM CST   Remote PPM Check with RIOS TECH1   [facility]   [place] ([facility])    [address]  [place] [address] [phone]           This appointment is for a remote check of your pacemaker.  This is not an appointment at the office.            Dec 11, 2017  1:00 PM CST   Level 2 with  INFUSION CHAIR 6   [facility] ([facility])    [facility]  [address]  [facility] [address] [phone]            Dec 18, 2017  2:00 PM CST   Level 2 with  INFUSION CHAIR 13   [facility] ([facility])    [facility]  [address]  [facility] [address] [phone]              Future tests that were ordered for you today     Open Standing Orders        Priority Remaining Interval Expires Ordered    Protein electrophoresis Routine 1/1 AM DRAW  12/4/2017    IgG Routine 1/1 AM DRAW  12/4/2017    Kappa and lambda light chain Routine 1/1 AM DRAW  12/4/2017            Who to contact     If you have questions or need follow up information about today's clinic visit or your schedule please contact [facility] directly at [phone].  Normal or non-critical lab and imaging results will be communicated to you by MyChart, letter or phone within 4 business days after the clinic has received the results.  November 5, 2018    Faustina Lubin  [address] [place] [address]      Dear MsEtelvina,      This letter is in regards to your recent cervical cancer screening (Pap smear and HPV test).    Your Pap smear result was reported as ASCUS or Atypical Squamous Cells of Undetermined Significance. This means that there were mildly abnormal cells found in the sample that we collected from your cervix. The vast majority of patients with this result do not have significant cervical abnormalities.     Your cervical sample was also tested for the presence of Human Papillomavirus (HPV). Your HPV test is NEGATIVE for high risk HPV, meaning that no HPV was found at this time.     Over time, your body can get rid of these abnormal cells, so it is recommended that you repeat your pap and HPV in 3 years.    If you have additional questions regarding this result, please call our registered nurse, Dorothy at [phone].    Please continue to be seen every year for an annual physical exam and other preventative tests.         Sincerely,    Analy Monzon MD/alex     "If you do not hear from us within 7 days, please contact the clinic through Yik Yak or phone. If you have a critical or abnormal lab result, we will notify you by phone as soon as possible.  Submit refill requests through [employer] or call your pharmacy and they will forward the refill request to us. Please allow 3 business days for your refill to be completed.          Additional Information About Your Visit        Yik Yak Information     [employer] lets you send messages to your doctor, view your test results, renew your prescriptions, schedule appointments and more. To sign up, go to [email or web address] . Click on \"Log in\" on the left side of the screen, which will take you to the Welcome page. Then click on \"Sign up Now\" on the right side of the page.     You will be asked to enter the access code listed below, as well as some personal information. Please follow the directions to create your username and password.     Your access code is: [email or web address]  Expires: 1/15/2018  1:13 PM     Your access code will  in 90 days. If you need help or a new code, please call your [place] clinic or [phone].        Care EveryWhere ID     This is your Care EveryWhere ID. This could be used by other organizations to access your [place] medical records  [id number]        Your Vitals Were     Pulse Temperature Respirations Height Pulse Oximetry BMI (Body Mass Index)    87 98.8  F (37.1  C) (Oral) 18 1.575 m (5' 2.01\") 90% 28.93 kg/m2       Blood Pressure from Last 3 Encounters:   17 140/78   17 140/78   17 143/69    Weight from Last 3 Encounters:   17 71.8 kg (158 lb 3.2 oz)   17 76 kg (167 lb 9.6 oz)   17 76.2 kg (168 lb)              We Performed the Following     CBC with platelets differential     Comprehensive metabolic panel     IgG     Kappa and lambda light chain     Protein electrophoresis          Today's Medication Changes          These changes are accurate as of: " 12/4/17  2:51 PM.  If you have any questions, ask your nurse or doctor.               Start taking these medicines.        Dose/Directions    potassium chloride 10 MEQ tablet   Commonly known as:  K-TAB,KLOR-CON   Used for:  Hypokalemia   Started by:  Alex Parry MD        Dose:  40 mEq   Take 4 tablets (40 mEq) by mouth once for 1 dose   Quantity:  4 tablet   Refills:  0            Where to get your medicines      These medications were sent to [facility] [place] - [address]  [address], [place] [address]     Phone:  [phone]     potassium chloride 10 MEQ tablet                Primary Care Provider Office Phone # Fax #    César Chung [phone]       [facility] [address]  [facility] [address]        Equal Access to Services     [facility]: Hadii althea quevedo hadasho Soterrieali, waaxda luqadaha, qaybta kaalmada adeegyada, waxay liana haydiana rodriguez . So [facility] [phone].    ATENCIÓN: Si habla español, tiene a contreras disposición servicios gratuitos de asistencia lingüística. Llame al [phone].    We comply with applicable federal civil rights laws and [place] laws. We do not discriminate on the basis of race, color, national origin, age, disability, sex, sexual orientation, or gender identity.            Thank you!     Thank you for choosing [facility]  for your care. Our goal is always to provide you with excellent care. Hearing back from our patients is one way we can continue to improve our services. Please take a few minutes to complete the written survey that you may receive in the mail after your visit with us. Thank you!             Your Updated Medication List - Protect others around you: Learn how to safely use, store and throw away your medicines at www.disposemymeds.org.          This list is accurate as of: 12/4/17  2:51 PM.  Always use your most recent med list.                    Brand Name Dispense Instructions for use Diagnosis    ACYCLOVIR PO      Take 400 mg by mouth 2 times daily        aspirin 81 MG chewable tablet      Take 81 mg by mouth daily        calcium carbonate-vitamin D 600-400 MG-UNIT Chew     180 tablet    Take 1 chew tab by mouth 2 times daily    Multiple myeloma not having achieved remission (H)       * dexamethasone 4 MG tablet    DECADRON    30 tablet    Take 10 tablets (40 mg) by mouth every 7 days Days 1, 8, and 15.    Multiple myeloma not having achieved remission (H)       * dexamethasone 4 MG tablet    DECADRON    30 tablet    Take 10 tablets (40 mg) by mouth every 7 days for 3 doses Days 1, 8, and 15.    Multiple myeloma not having achieved remission (H)       LENalidomide 10 MG Caps capsule CHEMO    REVLIMID    14 capsule    Take 1 capsule (10 mg) by mouth daily for 14 days Days 1 through 14.    Multiple myeloma not having achieved remission (H)       lisinopril 10 MG tablet    PRINIVIL/ZESTRIL    90 tablet    Take 1 tablet (10 mg) by mouth daily    Benign essential hypertension       LORazepam 0.5 MG tablet    ATIVAN    10 tablet    Take 1 tablet (0.5 mg) by mouth every 8 hours as needed (Anxiety, Nausea/Vomiting or Sleep)    Multiple myeloma not having achieved remission (H)       nitroGLYcerin 0.4 MG sublingual tablet    NITROSTAT    25 tablet    For chest pain place 1 tablet under the tongue every 5 minutes for 3 doses. If symptoms persist 5 minutes after 1st dose call 911.    Atypical chest pain       * order for DME     1 Units    Dispense one 4 wheeled walker with hand brakes and seat    Multiple myeloma not having achieved remission (H)       * order for DME     1 Units    Wheelchair.    Need for assistance due to unsteady gait       potassium chloride 10 MEQ tablet    K-TAB,KLOR-CON    4 tablet    Take 4 tablets (40 mEq) by mouth once for 1 dose    Hypokalemia       prochlorperazine 10 MG tablet    COMPAZINE    30 tablet    Take 1 tablet  (10 mg) by mouth every 6 hours as needed (Nausea/Vomiting)    Multiple myeloma not having achieved remission (H)       * Notice:  This list has 4 medication(s) that are the same as other medications prescribed for you. Read the directions carefully, and ask your doctor or other care provider to review them with you.

## 2018-10-30 NOTE — MR AVS SNAPSHOT
After Visit Summary   10/30/2018    Faustina Lubin    MRN: [id number]           Patient Information     Date Of Birth          1990        Visit Information        Provider Department      10/30/2018 9:40 AM Analy Monzon MD [facility]        Today's Diagnoses     Routine history and physical examination of adult    -  1    Oral contraceptive pill surveillance          Care Instructions      Preventive Health Recommendations  Female Ages 26 - 39  Yearly exam:   See your health care provider every year in order to    Review health changes.     Discuss preventive care.      Review your medicines if you your doctor has prescribed any.    Until age 30: Get a Pap test every three years (more often if you have had an abnormal result).    After age 30: Talk to your doctor about whether you should have a Pap test every 3 years or have a Pap test with HPV screening every 5 years.   You do not need a Pap test if your uterus was removed (hysterectomy) and you have not had cancer.  You should be tested each year for STDs (sexually transmitted diseases), if you're at risk.   Talk to your provider about how often to have your cholesterol checked.  If you are at risk for diabetes, you should have a diabetes test (fasting glucose).  Shots: Get a flu shot each year. Get a tetanus shot every 10 years.   Nutrition:     Eat at least 5 servings of fruits and vegetables each day.    Eat whole-grain bread, whole-wheat pasta and brown rice instead of white grains and rice.    Get adequate Calcium and Vitamin D.     Lifestyle    Exercise at least 150 minutes a week (30 minutes a day, 5 days of the week). This will help you control your weight and prevent disease.    Limit alcohol to one drink per day.    No smoking.     Wear sunscreen to prevent skin cancer.    See your dentist every six months for an exam and cleaning.            Follow-ups after your visit        Follow-up notes from your  "care team     Return in about 1 year (around 10/30/2019) for Physical Exam.      Who to contact     If you have questions or need follow up information about today's clinic visit or your schedule please contact [facility] [place] directly at [phone].  Normal or non-critical lab and imaging results will be communicated to you by MyChart, letter or phone within 4 business days after the clinic has received the results. If you do not hear from us within 7 days, please contact the clinic through MyChart or phone. If you have a critical or abnormal lab result, we will notify you by phone as soon as possible.  Submit refill requests through [employer] or call your pharmacy and they will forward the refill request to us. Please allow 3 business days for your refill to be completed.          Additional Information About Your Visit        MyChart Information     Nuve lets you send messages to your doctor, view your test results, renew your prescriptions, schedule appointments and more. To sign up, go to [email or web address] . Click on \"Log in\" on the left side of the screen, which will take you to the Welcome page. Then click on \"Sign up Now\" on the right side of the page.     You will be asked to enter the access code listed below, as well as some personal information. Please follow the directions to create your username and password.     Your access code is: [email or web address]  Expires: 2019 10:06 AM     Your access code will  in 90 days. If you need help or a new code, please call your [place] clinic or [phone].        Care EveryWhere ID     This is your Care EveryWhere ID. This could be used by other organizations to access your [place] medical records  [id number]        Your Vitals Were     Pulse Temperature Last Period Pulse Oximetry BMI (Body Mass Index)       96 97.9  F (36.6  C) (Oral) 10/16/2018 98% 21.95 kg/m2        Blood Pressure from Last 3 Encounters:   10/30/18 104/68 "   07/17/18 102/67   06/12/18 110/70    Weight from Last 3 Encounters:   10/30/18 127 lb (57.6 kg)   07/17/18 123 lb (55.8 kg)   06/12/18 123 lb (55.8 kg)              We Performed the Following     PAP imaged thin layer screen reflex to HPV if ASCUS - recommended age 25 - 29 years          Where to get your medicines      These medications were sent to [id number] - [place] - [address]  [address], [place] [address]     Phone:  [phone]     desogestrel-ethinyl estradiol 0.15-30 MG-MCG per tablet          Primary Care Provider Office Phone # Fax #    [facility] [phone]       [address] [address]        Equal Access to Services     [facility] : Hadii pranay Nicole, waaxda lubranadaha, qaybta kaalmada adeisaiah, kim castellanos . So [facility] [phone].    ATENCIÓN: Si habla español, tiene a lopez disposición servicios gratuitos de asistencia lingüística. Ll[facility] [phone].    We comply with applicable federal civil rights laws and [place] laws. We do not discriminate on the basis of race, color, national origin, age, disability, sex, sexual orientation, or gender identity.            Thank you!     Thank you for choosing [facility]  for your care. Our goal is always to provide you with excellent care. Hearing back from our patients is one way we can continue to improve our services. Please take a few minutes to complete the written survey that you may receive in the mail after your visit with us. Thank you!             Your Updated Medication List - Protect others around you: Learn how to safely use, store and throw away your medicines at www.disposemymeds.org.          This list is accurate as of 10/30/18 10:06 AM.  Always use your most recent med list.                   Brand Name Dispense Instructions for use Diagnosis    desogestrel-ethinyl estradiol 0.15-30 MG-MCG  per tablet    JULEBER    84 tablet    Take 1 tablet by mouth daily    Oral contraceptive pill surveillance

## 2018-11-01 LAB
COPATH REPORT: ABNORMAL
PAP: ABNORMAL

## 2018-11-02 LAB
FINAL DIAGNOSIS: NORMAL
HPV HR 12 DNA CVX QL NAA+PROBE: NEGATIVE
HPV16 DNA SPEC QL NAA+PROBE: NEGATIVE
HPV18 DNA SPEC QL NAA+PROBE: NEGATIVE
SPECIMEN DESCRIPTION: NORMAL
SPECIMEN SOURCE CVX/VAG CYTO: NORMAL

## 2018-12-06 ENCOUNTER — OFFICE VISIT (OUTPATIENT)
Dept: FAMILY MEDICINE | Facility: CLINIC | Age: 28
End: 2018-12-06
Payer: COMMERCIAL

## 2018-12-06 VITALS
HEIGHT: 64 IN | DIASTOLIC BLOOD PRESSURE: 64 MMHG | TEMPERATURE: 97.8 F | BODY MASS INDEX: 22.02 KG/M2 | HEART RATE: 72 BPM | WEIGHT: 129 LBS | SYSTOLIC BLOOD PRESSURE: 104 MMHG

## 2018-12-06 DIAGNOSIS — N89.8 VAGINAL DISCHARGE: Primary | ICD-10-CM

## 2018-12-06 LAB
SPECIMEN SOURCE: NORMAL
WET PREP SPEC: NORMAL

## 2018-12-06 PROCEDURE — 99213 OFFICE O/P EST LOW 20 MIN: CPT | Performed by: PHYSICIAN ASSISTANT

## 2018-12-06 PROCEDURE — 87210 SMEAR WET MOUNT SALINE/INK: CPT | Performed by: PHYSICIAN ASSISTANT

## 2018-12-06 PROCEDURE — 87491 CHLMYD TRACH DNA AMP PROBE: CPT | Performed by: PHYSICIAN ASSISTANT

## 2018-12-06 PROCEDURE — 87591 N.GONORRHOEAE DNA AMP PROB: CPT | Performed by: PHYSICIAN ASSISTANT

## 2018-12-06 NOTE — PROGRESS NOTES
SUBJECTIVE:   Faustina Lubin is a 28 year old female who presents to clinic today for the following health issues:      Vaginal Symptoms      Duration: x 1 month     Description  vaginal discharge - white, odor, pain with intercourse and discomfort.     Intensity:  moderate    Accompanying signs and symptoms (fever/dysuria/abdominal or back pain): None    History  Sexually active: yes, single partner, contraception - oral contraceptives (combined)  Possibility of pregnancy: No  Recent antibiotic use: no     Precipitating or alleviating factors: None    Therapies tried and outcome: none   Outcome: None     Patient is a 29 yo previously healthy who presents with a complaint of vaginal discharge. Patient stated that she has had her symptoms for 2 months and they have not gotten any better. She has had white, odorous discharge with mild discomfort and dyspareunia. Patient also endorsed burning and itching in the vulvovaginal area. No concern for STDs or pregnancy (LMP was 2 weeks ago, patient regularly takes OCPs without any missing dose).         Patient Active Problem List   Diagnosis     Fibrocystic breast changes     CARDIOVASCULAR SCREENING; LDL GOAL LESS THAN 160     Headache     Cervicalgia     Mild single current episode of major depressive disorder (H)     Oral contraceptive pill surveillance     Atypical squamous cells of undetermined significance (ASCUS) on Papanicolaou smear of cervix     Past Surgical History:   Procedure Laterality Date     NO HISTORY OF SURGERY         Social History   Substance Use Topics     Smoking status: Never Smoker     Smokeless tobacco: Never Used     Alcohol use 0.0 - 0.6 oz/week     0 - 1 Standard drinks or equivalent per week      Comment: less than one per week      Family History   Problem Relation Age of Onset     Cancer Maternal Grandmother      ovary and uterine, age 76     Hypertension Maternal Grandmother      Diabetes Paternal Grandfather      Breast Cancer No  "family hx of      Colon Cancer No family hx of      Myocardial Infarction No family hx of          Current Outpatient Prescriptions   Medication Sig Dispense Refill     desogestrel-ethinyl estradiol (JULEBER) 0.15-30 MG-MCG per tablet Take 1 tablet by mouth daily 84 tablet 3     Allergies   Allergen Reactions     No Known Drug Allergies      BP Readings from Last 3 Encounters:   12/06/18 104/64   10/30/18 104/68   07/17/18 102/67    Wt Readings from Last 3 Encounters:   12/06/18 129 lb (58.5 kg)   10/30/18 127 lb (57.6 kg)   07/17/18 123 lb (55.8 kg)                    Reviewed and updated as needed this visit by clinical staff       Reviewed and updated as needed this visit by Provider         ROS:  Constitutional, HEENT, cardiovascular, pulmonary, gi and gu systems are negative, except as otherwise noted.    OBJECTIVE:     /64  Pulse 72  Temp 97.8  F (36.6  C) (Tympanic)  Ht 5' 3.78\" (1.62 m)  Wt 129 lb (58.5 kg)  Breastfeeding? No  BMI 22.3 kg/m2  Body mass index is 22.3 kg/(m^2).  GENERAL: healthy, alert and no distress   (female): normal female external genitalia, normal urethral meatus, vaginal mucosa pink and moist. Cervix appears non-inflamed, without cervical motion tenderness. Moderate amount of milky, white discharge surrounding the cervix.     Diagnostic Test Results:  Results for orders placed or performed in visit on 12/06/18 (from the past 24 hour(s))   Wet prep   Result Value Ref Range    Specimen Description Vagina     Wet Prep No Trichomonas seen     Wet Prep No clue cells seen     Wet Prep No yeast seen        ASSESSMENT/PLAN:       1. Vaginal discharge   Self collected and provider collected Wet prep negative today. Advise that she try sitz baths and will test her for STDs today.  This has been an ongoing problem for her.  She is interested in seeing OBGYN , this referral was placed  - Wet prep- negative  - OB/GYN REFERRAL  - Chlamydia trachomatis PCR  - Neisseria gonorrhoeae PCR  - " Sitz bath prn  - Call/RTC if new or worsening symptoms prior to OB/GYN visit    Patient understood and agreed with treatment plan and left clinic today without further questions.    Isauro Hatfield PA-C  [facility] [place]

## 2018-12-06 NOTE — NURSING NOTE
"Chief Complaint   Patient presents with     Vaginal Problem       Initial /64  Pulse 72  Temp 97.8  F (36.6  C) (Tympanic)  Ht 5' 3.78\" (1.62 m)  Wt 129 lb (58.5 kg)  Breastfeeding? No  BMI 22.3 kg/m2 Estimated body mass index is 22.3 kg/(m^2) as calculated from the following:    Height as of this encounter: 5' 3.78\" (1.62 m).    Weight as of this encounter: 129 lb (58.5 kg).  BP completed using cuff size: regular    Health Maintenance Due   Topic Date Due     HIV SCREEN (SYSTEM ASSIGNED)  11/08/2009     PHQ-9 Q6 MONTHS  12/12/2018         Mariama Solorzano MA 12/06/18        "

## 2018-12-06 NOTE — MR AVS SNAPSHOT
After Visit Summary   12/6/2018    Faustina Lubin    MRN: [id number]           Patient Information     Date Of Birth          1990        Visit Information        Provider Department      12/6/2018 11:00 AM Isauro Hatfield PA-C [facility] [place]        Today's Diagnoses     Vaginal discharge    -  1       Follow-ups after your visit        Additional Services     OB/GYN REFERRAL       Your provider has referred you to:  [facility]: [facility] [phone]   [email or web address]    Please be aware that coverage of these services is subject to the terms and limitations of your health insurance plan.  Call member services at your health plan with any benefit or coverage questions.      Please bring the following with you to your appointment:    (1) Any X-Rays, CTs or MRIs which have been performed.  Contact the facility where they were done to arrange for  prior to your scheduled appointment.   (2) List of current medications   (3) This referral request   (4) Any documents/labs given to you for this referral                  Who to contact     If you have questions or need follow up information about today's clinic visit or your schedule please contact [facility] directly at [phone].  Normal or non-critical lab and imaging results will be communicated to you by [employer], letter or phone within 4 business days after the clinic has received the results. If you do not hear from us within 7 days, please contact the clinic through [employer] or phone. If you have a critical or abnormal lab result, we will notify you by phone as soon as possible.  Submit refill requests through [employer] or call your pharmacy and they will forward the refill request to us. Please allow 3 business days for your refill to be completed.          Additional Information About Your Visit        [employer]har[employer]aserLeap Information     [employer] [facility] lets you send messages to your doctor,  "view your test results, renew your prescriptions, schedule appointments and more. To sign up, go to [email or web address] . Click on \"Log in\" on the left side of the screen, which will take you to the Welcome page. Then click on \"Sign up Now\" on the right side of the page.     You will be asked to enter the access code listed below, as well as some personal information. Please follow the directions to create your username and password.     Your access code is: [email or web address]  Expires: 2019  9:06 AM     Your access code will  in 90 days. If you need help or a new code, please call your [place] clinic or [phone].        Care EveryWhere ID     This is your Care EveryWhere ID. This could be used by other organizations to access your [place] medical records  [id number]        Your Vitals Were     Pulse Temperature Height Breastfeeding? BMI (Body Mass Index)       72 97.8  F (36.6  C) (Tympanic) 5' 3.78\" (1.62 m) No 22.3 kg/m2        Blood Pressure from Last 3 Encounters:   18 104/64   10/30/18 104/68   18 102/67    Weight from Last 3 Encounters:   18 129 lb (58.5 kg)   10/30/18 127 lb (57.6 kg)   18 123 lb (55.8 kg)              We Performed the Following     Chlamydia trachomatis PCR     Neisseria gonorrhoeae PCR     OB/GYN REFERRAL     Wet prep        Primary Care Provider Office Phone # Fax #    Analy Monzon [phone]       [address] [facility] [address]        Equal Access to Services     [facility] : Hadii pranay corbin Sojosh, waaxda luqadaha, qaybta kaalmada emile, kim smith. So [facility] [phone].    ATENCIÓN: Si habla español, tiene a lopez disposición servicios gratuitos de asistencia lingüística. Llame al [phone].    We comply with applicable federal civil rights laws and [place] laws. We do not discriminate on the basis of race, color, national origin, age, disability, sex, sexual " orientation, or gender identity.            Thank you!     Thank you for choosing [facility]  for your care. Our goal is always to provide you with excellent care. Hearing back from our patients is one way we can continue to improve our services. Please take a few minutes to complete the written survey that you may receive in the mail after your visit with us. Thank you!             Your Updated Medication List - Protect others around you: Learn how to safely use, store and throw away your medicines at www.disposemymeds.org.          This list is accurate as of 12/6/18 11:56 AM.  Always use your most recent med list.                   Brand Name Dispense Instructions for use Diagnosis    desogestrel-ethinyl estradiol 0.15-30 MG-MCG tablet    JULEBER    84 tablet    Take 1 tablet by mouth daily    Oral contraceptive pill surveillance

## 2018-12-06 NOTE — LETTER
December 7, 2018      Faustina Lubin  [address] [place] [address]        Dear ,    We are writing to inform you of your test results.      -Chlamydia and gonnohrea tests are normal.      Resulted Orders   Wet prep   Result Value Ref Range    Specimen Description Vagina     Wet Prep No Trichomonas seen     Wet Prep No clue cells seen     Wet Prep No yeast seen    Chlamydia trachomatis PCR   Result Value Ref Range    Specimen Description Urine     Chlamydia Trachomatis PCR Negative NEG^Negative      Comment:      Negative for C. trachomatis rRNA by transcription mediated amplification.  A negative result by transcription mediated amplification does not preclude   the presence of C. trachomatis infection because results are dependent on   proper and adequate collection, absence of inhibitors, and sufficient rRNA to   be detected.     Neisseria gonorrhoeae PCR   Result Value Ref Range    Specimen Descrip Urine     N Gonorrhea PCR Negative NEG^Negative      Comment:      Negative for N. gonorrhoeae rRNA by transcription mediated amplification.  A negative result by transcription mediated amplification does not preclude   the presence of N. gonorrhoeae infection because results are dependent on   proper and adequate collection, absence of inhibitors, and sufficient rRNA to   be detected.         If you have any questions or concerns, please call the clinic at the number listed above.       Sincerely,          Isauro Hatfield PA-C

## 2018-12-07 NOTE — PROGRESS NOTES
Letter sent to patient with results.    Isauro Hatfield PA-C  [facility]

## 2019-01-24 ENCOUNTER — TELEPHONE (OUTPATIENT)
Dept: FAMILY MEDICINE | Facility: CLINIC | Age: 29
End: 2019-01-24

## 2019-01-24 NOTE — TELEPHONE ENCOUNTER
Reason for Call: Request for an order or referral:    Order or referral being requested: [facility]  location    Date needed: as soon as possible    Has the patient been seen by the PCP for this problem? YES    Additional comments: was given referral on AVS but [facility] is saying they want faxed copy and office visit notes from [facility]. Pt has appt tomorrow 1/25 @ 10:30am. Please fax stat.    Additional Services      OB/GYN REFERRAL        Your provider has referred you to:  N: [facility]  [place] [phone]   [email or web address]     Please be aware that coverage of these services is subject to the terms and limitations of your health insurance plan.  Call member services at your health plan with any benefit or coverage questions.       Please bring the following with you to your appointment:     (1) Any X-Rays, CTs or MRIs which have been performed.  Contact the facility where they were done to arrange for  prior to your scheduled appointment.   (2) List of current medications   (3) This referral request   (4) Any documents/labs given to you for this referral                 Phone number Patient can be reached at:  Cell number on file:    Telephone Information:   Mobile [phone]       Best Time:  today    Can we leave a detailed message on this number?  YES    Call taken on 1/24/2019 at 10:32 AM by Joann Waggoner

## 2019-07-08 ENCOUNTER — OFFICE VISIT (OUTPATIENT)
Dept: FAMILY MEDICINE | Facility: CLINIC | Age: 29
End: 2019-07-08
Payer: COMMERCIAL

## 2019-07-08 VITALS
WEIGHT: 129 LBS | SYSTOLIC BLOOD PRESSURE: 102 MMHG | HEART RATE: 86 BPM | BODY MASS INDEX: 22.02 KG/M2 | DIASTOLIC BLOOD PRESSURE: 62 MMHG | TEMPERATURE: 98.7 F | HEIGHT: 64 IN | OXYGEN SATURATION: 99 %

## 2019-07-08 DIAGNOSIS — B96.89 BACTERIAL VAGINOSIS: ICD-10-CM

## 2019-07-08 DIAGNOSIS — R10.2 VAGINAL PAIN: Primary | ICD-10-CM

## 2019-07-08 DIAGNOSIS — N76.0 BACTERIAL VAGINOSIS: ICD-10-CM

## 2019-07-08 LAB
SPECIMEN SOURCE: ABNORMAL
WET PREP SPEC: ABNORMAL

## 2019-07-08 PROCEDURE — 99213 OFFICE O/P EST LOW 20 MIN: CPT | Performed by: INTERNAL MEDICINE

## 2019-07-08 PROCEDURE — 87210 SMEAR WET MOUNT SALINE/INK: CPT | Performed by: INTERNAL MEDICINE

## 2019-07-08 RX ORDER — ESTRADIOL 0.1 MG/G
CREAM VAGINAL
COMMUNITY
Start: 2019-01-28 | End: 2021-06-23

## 2019-07-08 RX ORDER — METRONIDAZOLE 7.5 MG/G
1 GEL VAGINAL 2 TIMES DAILY
Qty: 50 G | Refills: 0 | Status: SHIPPED | OUTPATIENT
Start: 2019-07-08 | End: 2019-09-13

## 2019-07-08 ASSESSMENT — ANXIETY QUESTIONNAIRES
IF YOU CHECKED OFF ANY PROBLEMS ON THIS QUESTIONNAIRE, HOW DIFFICULT HAVE THESE PROBLEMS MADE IT FOR YOU TO DO YOUR WORK, TAKE CARE OF THINGS AT HOME, OR GET ALONG WITH OTHER PEOPLE: NOT DIFFICULT AT ALL
1. FEELING NERVOUS, ANXIOUS, OR ON EDGE: NOT AT ALL
GAD7 TOTAL SCORE: 2
3. WORRYING TOO MUCH ABOUT DIFFERENT THINGS: SEVERAL DAYS
6. BECOMING EASILY ANNOYED OR IRRITABLE: SEVERAL DAYS
7. FEELING AFRAID AS IF SOMETHING AWFUL MIGHT HAPPEN: NOT AT ALL
5. BEING SO RESTLESS THAT IT IS HARD TO SIT STILL: NOT AT ALL
2. NOT BEING ABLE TO STOP OR CONTROL WORRYING: NOT AT ALL

## 2019-07-08 ASSESSMENT — MIFFLIN-ST. JEOR: SCORE: 1301.65

## 2019-07-08 ASSESSMENT — PATIENT HEALTH QUESTIONNAIRE - PHQ9
SUM OF ALL RESPONSES TO PHQ QUESTIONS 1-9: 0
5. POOR APPETITE OR OVEREATING: NOT AT ALL

## 2019-07-08 NOTE — PROGRESS NOTES
"Subjective     Faustina Lubin is a 28 year old female who presents to clinic today for the following health issues:    HPI   Vaginal Symptoms  Onset:  2 weeks ago     Description:  Vaginal Discharge: clear/white  Itching (Pruritis): no   Burning sensation:  YES  Odor: no     Accompanying Signs & Symptoms:  Pain with Urination: no   Abdominal Pain: no   Fever: no     History:   Sexually active: YES  New Partner: no   Possibility of Pregnancy:  No    Precipitating factors:   Recent Antibiotic Use: no     Alleviating factors:    Faustina is here with vaginal pain.  This has bothered her intermitently for awhile.  Sometimes there is a vaginal infection, other times there isn't.  Has discomfort on the skin, also internal discomfort with intercourse.  [facility] 6 months ago and was prescribed a vaginal estrogen.  She did notice improvement in her pain when she was using consistently.  More recently has had trouble remembering so is only using a few times per month.        Right ear has been intermittently painful.  Would like me to take a look.     Bumps on the end of her toe - not bothersome, there for about a week       Reviewed and updated as needed this visit by Provider         Review of Systems   Const, gu, skin reviewed,  otherwise negative unless noted above.        Objective    /62   Pulse 86   Temp 98.7  F (37.1  C) (Tympanic)   Ht 1.62 m (5' 3.78\")   Wt 58.5 kg (129 lb)   LMP 06/25/2019   SpO2 99%   BMI 22.30 kg/m    Body mass index is 22.3 kg/m .  Physical Exam   GENERAL: healthy, alert and no distress  HENT: ear canals and TM's normal b/l    (female): normal female external genitalia, normal urethral meatus.   Skin: tiny red and flesh colored papules at tip of great toe     Diagnostic Test Results:  Results for orders placed or performed in visit on 07/08/19 (from the past 24 hour(s))   Wet prep   Result Value Ref Range    Specimen Description Vagina     Wet Prep No " Trichomonas seen     Wet Prep Rare  Clue cells seen   (A)     Wet Prep No yeast seen     Wet Prep Rare  WBC'S seen              Assessment & Plan     1. Vaginal pain  Will treat the BV.  Recommended she go back to the routine dosing of topical estrogen since that seemed to be helping.  Follow up with ob-gyn if not improving.   - Wet prep    2. Bacterial vaginosis  - metroNIDAZOLE (METROGEL) 0.75 % vaginal gel; Place 1 applicator (5 g) vaginally 2 times daily for 5 days  Dispense: 50 g; Refill: 0       Given reassurance regarding the ear.  Recommended monitoring the toe sine it isn't bothering her.         Return in about 1 month (around 8/8/2019) for if needed with ob gyn .    Zeny Stephens MD  [facility] [place]

## 2019-07-09 ASSESSMENT — ANXIETY QUESTIONNAIRES: GAD7 TOTAL SCORE: 2

## 2019-07-11 ENCOUNTER — TELEPHONE (OUTPATIENT)
Dept: FAMILY MEDICINE | Facility: CLINIC | Age: 29
End: 2019-07-11

## 2019-07-11 DIAGNOSIS — B37.31 YEAST INFECTION OF THE VAGINA: Primary | ICD-10-CM

## 2019-07-11 NOTE — TELEPHONE ENCOUNTER
Patient calling stating since starting the metronidazole which is clear and now patient is having discharge that looks like cottage cheese.  Patient just flew to [place] for a bachelorette party.    Advised patient she can purchase Monistat OTC at a local pharmacy.  Patient is wondering if she should continue to use the metronidazole due to this discharge.  Patient will  a OTC Monistat.    Routing to provider for review and advise as appropriate.    MOO ConnellN, RN  [facility] [employer] [facility]

## 2019-07-11 NOTE — TELEPHONE ENCOUNTER
Reason for call:  Patient reporting a symptom    Symptom or request: The patient is calling to speak with a nurse regarding increased vaginal discharge since she was seen by Dr. Stephens on 07/08/2019. She thinks it's due to the metronidazole cream she was prescribed. She would like a call back.    Duration (how long have symptoms been present): Two days    Have you been treated for this before? Yes    Phone Number patient can be reached at:  Cell number on file:    Telephone Information:   Mobile [phone]     Best Time:  Anytime    Can we leave a detailed message on this number:  YES    Call taken on 7/11/2019 at 11:20 AM by Katie Mendez

## 2019-07-11 NOTE — TELEPHONE ENCOUNTER
Left detailed message for patient to call clinic with pharmacy if she would like an Rx sent in.  Patient is currently in [place] and may return call to clinic if she can find a local pharmacy.    MOO ConnellN, RN  [employer] [facility]

## 2019-07-11 NOTE — TELEPHONE ENCOUNTER
She should complete the course of flagyl to treat the BV.    I can send in a dose of fluconazole to a local pharmacy if she'd like    Zeny Stephens MD

## 2019-07-12 RX ORDER — FLUCONAZOLE 150 MG/1
150 TABLET ORAL ONCE
Qty: 1 TABLET | Refills: 0 | Status: SHIPPED | OUTPATIENT
Start: 2019-07-12 | End: 2019-09-13

## 2019-07-12 NOTE — TELEPHONE ENCOUNTER
Patient given message below from Dr. Stephens. Patient agrees to finish metronidazole gel. Informed of new prescription for fluconazole. Advised to follow up next week when back in [place] if symptoms have not resolved. Jacqueline Snow RN

## 2019-09-13 ENCOUNTER — OFFICE VISIT (OUTPATIENT)
Dept: FAMILY MEDICINE | Facility: CLINIC | Age: 29
End: 2019-09-13
Payer: COMMERCIAL

## 2019-09-13 VITALS
WEIGHT: 132 LBS | DIASTOLIC BLOOD PRESSURE: 70 MMHG | OXYGEN SATURATION: 98 % | BODY MASS INDEX: 22.53 KG/M2 | SYSTOLIC BLOOD PRESSURE: 112 MMHG | TEMPERATURE: 98.2 F | HEART RATE: 87 BPM | HEIGHT: 64 IN

## 2019-09-13 DIAGNOSIS — N94.9 VAGINAL DISCOMFORT: Primary | ICD-10-CM

## 2019-09-13 LAB
SPECIMEN SOURCE: NORMAL
WET PREP SPEC: NORMAL

## 2019-09-13 PROCEDURE — 99213 OFFICE O/P EST LOW 20 MIN: CPT | Performed by: INTERNAL MEDICINE

## 2019-09-13 PROCEDURE — 87210 SMEAR WET MOUNT SALINE/INK: CPT | Performed by: INTERNAL MEDICINE

## 2019-09-13 ASSESSMENT — MIFFLIN-ST. JEOR: SCORE: 1314.78

## 2019-09-13 NOTE — PROGRESS NOTES
"Subjective     Faustina Lubin is a 28 year old female who presents to clinic today for the following health issues:    HPI   Vaginal Symptoms  Faustina is here with vaginal discomfort and discharge.  I last saw her about 2 months ago she was treated for BV at that time and then developed what sounded like a yeast infection when on vacation and was treated empirically with fluconazole.  She felt her symptoms resolved for several weeks.  Then a few weeks ago started back again.  She has vaginal discharge and intermittent vaginal discomfort, worse with sexual activity.  She has been using the estrogen cream more regularly, 1-2 times per week.  Not sure if it is really helping overall.      Duration: 1 month    Description  vaginal discharge - white and odor    Intensity:  Some mild discomfort - painful during intercourse     Accompanying signs and symptoms (fever/dysuria/abdominal or back pain): None    History  Sexually active: yes, single partner, contraception - bcp  Possibility of pregnancy: No  Recent antibiotic use: no     Precipitating or alleviating factors: None    Therapies tried and outcome: estradiol once a week    Outcome: sometimes helps and sometimes doesn't             Reviewed and updated as needed this visit by Provider         Review of Systems   Const,  reviewed,  otherwise negative unless noted above.        Objective    /70 (BP Location: Right arm, Patient Position: Chair, Cuff Size: Adult Regular)   Pulse 87   Temp 98.2  F (36.8  C) (Oral)   Ht 1.619 m (5' 3.75\")   Wt 59.9 kg (132 lb)   LMP 08/21/2019   SpO2 98%   BMI 22.84 kg/m    Body mass index is 22.84 kg/m .  Physical Exam   GENERAL: healthy, alert and no distress    Diagnostic Test Results:  Results for orders placed or performed in visit on 09/13/19 (from the past 24 hour(s))   Wet prep   Result Value Ref Range    Specimen Description Vagina     Wet Prep No clue cells seen     Wet Prep No Trichomonas seen     Wet Prep No " yeast seen            Assessment & Plan     1. Vaginal discomfort  Wet prep is negative.  She has had ongoing issues with vaginal discomfort and discharge sometimes due to an infection and sometimes there is no infection present.  She is going to follow-up with her OB/GYN next month, continue with estrogen cream.  I discussed the option of pelvic floor physical therapy.  She will think about it, discuss it further at her gyn visit.  - Wet prep           Zeny Stephens MD  [facility] [place]

## 2019-10-02 DIAGNOSIS — Z30.41 ORAL CONTRACEPTIVE PILL SURVEILLANCE: ICD-10-CM

## 2019-10-02 RX ORDER — DESOGESTREL AND ETHINYL ESTRADIOL 0.15-0.03
KIT ORAL
Qty: 84 TABLET | Refills: 3 | Status: SHIPPED | OUTPATIENT
Start: 2019-10-02 | End: 2020-07-15

## 2019-10-02 NOTE — TELEPHONE ENCOUNTER
"Requested Prescriptions   Pending Prescriptions Disp Refills     APRI 0.15-30 MG-MCG tablet [Pharmacy Med Name: APRI 28 DAY TABLET] 84 tablet 3     Sig: TAKE 1 TABLET BY MOUTH EVERY DAY  Last Written Prescription Date:  10/30/18  Last Fill Quantity: 84,  # refills: 3   Last office visit: 9/13/2019 with prescribing provider:  Angelo   Future Office Visit:           Contraceptives Protocol Passed - 10/2/2019 12:10 PM        Passed - Patient is not a current smoker if age is 35 or older        Passed - Recent (12 mo) or future (30 days) visit within the authorizing provider's specialty     Patient has had an office visit with the authorizing provider or a provider within the authorizing providers department within the previous 12 mos or has a future within next 30 days. See \"Patient Info\" tab in inbasket, or \"Choose Columns\" in Meds & Orders section of the refill encounter.              Passed - Medication is active on med list        Passed - No active pregnancy on record        Passed - No positive pregnancy test in past 12 months          "

## 2019-11-15 ENCOUNTER — TELEPHONE (OUTPATIENT)
Dept: FAMILY MEDICINE | Facility: CLINIC | Age: 29
End: 2019-11-15

## 2019-11-15 NOTE — TELEPHONE ENCOUNTER
Returned call to patient.  Patient was sick with a head cold last week and is now to the point where she is having thick secretions.      Advised patient to drink warm liquids such as tea with honey and lemon, avoid dairy products, OTC mucinex-D and use a humidifier, cough drops in the freezer.  Patient stated understanding and was agreeable with plan.    RANJAN Connell, RN  [employer] [facility]

## 2019-11-15 NOTE — TELEPHONE ENCOUNTER
Reason for call:  Patient reporting a symptom    Symptom or request: phlegm in throat    Duration (how long have symptoms been present): over a week    Have you been treated for this before? No    Additional comments: Pt stated getting over cold but she can feel phlegm in her throat. Wondering if there is something she can do to help with this.    Phone Number patient can be reached at:  Cell number on file:    Telephone Information:   Mobile [phone]       Best Time:  Before 2pm 11/15/19 if possible - Pt goes to work at 2pm    Can we leave a detailed message on this number:  YES    Call taken on 11/15/2019 at 12:27 PM by Lise Kang

## 2020-05-26 ENCOUNTER — TELEPHONE (OUTPATIENT)
Dept: FAMILY MEDICINE | Facility: CLINIC | Age: 30
End: 2020-05-26

## 2020-05-26 NOTE — TELEPHONE ENCOUNTER
Reason for Call:   call back    Detailed comments: stomach aches and diarrhea for a mo. And has not had her period for 3 mo.   Not sure if she need to have a  Appt.     Phone Number Patient can be reached at: Cell number on file:    Telephone Information:   Mobile [phone]     PT wokrs at 6 pm - would need to leave by 5:30 pm   Best Time: any    Can we leave a detailed message on this number? YES    Call taken on 5/26/2020 at 2:03 PM by Heather Garcia

## 2020-05-26 NOTE — TELEPHONE ENCOUNTER
Spoke with patient, states she is having stomach cramps and diarrhea on and off for about a month. Roughly 3-4 times per week. Not sure if its anxiety related, states it gets worse when she is anxious. Denies any food allergies that she is aware of.     If she goes to the bathroom sxs will resolve for a while. Works overnights now which switched about a month ago.     Also notes it has been 3 months since her last menstrual cycle. Is due to get it this week and if she does not get it it will be 4 months since last period. Has had irregular periods for over a year. Up until this past year was more regular. Took an at home pregnancy test and that was negative.     I recommended keeping a food diary to see if there are any common foods that she is having when she has diarrhea. I also recommended taking OTC imodium to see if that helps with her sxs. She agreed to this. I advised to try for 1 week and to call back if no change or improvement. By that time, she will know if she has missed her upcoming menstrual cycle as well and both things can be addressed at the same time. I advised if new sxs develop like pain, fever, blood in stool, vomiting or her symptoms get worse or become more frequent to call back. Patient/ parent verbalized understanding and agrees with plan.      Yolanda Lawson RN   [facility] - Triage

## 2020-06-08 ENCOUNTER — TELEPHONE (OUTPATIENT)
Dept: FAMILY MEDICINE | Facility: CLINIC | Age: 30
End: 2020-06-08

## 2020-06-08 NOTE — TELEPHONE ENCOUNTER
Reason for Call:  Other call back    Detailed comments: Pt is calling to speak with a nurse. Would like to discuss Plan B contraceptive. Please call back when possible. Thanks!    Phone Number Patient can be reached at: Cell number on file:    Telephone Information:   Mobile [phone]       Best Time: any    Can we leave a detailed message on this number? YES    Call taken on 6/8/2020 at 12:17 PM by Lise Kang

## 2020-06-08 NOTE — TELEPHONE ENCOUNTER
Pt calling states she has been on ocps since 2010 and takes it regularly but not always at the same time.  Friday night had unprotected sex and took her pill 12 hours later than usual.  Wondering if she needs Plan B?  Pt states she has not gotten her period in the past 3 months and did a pregnancy test a couple of weeks ago which was negative.    Advised pt she does not need Plan B medication at this time since she has been on the ocps for awhile and took the medication later than usual.    Pt states understanding.    Sowmya TALBOT RN  EP Triage

## 2020-07-13 ENCOUNTER — NURSE TRIAGE (OUTPATIENT)
Dept: FAMILY MEDICINE | Facility: CLINIC | Age: 30
End: 2020-07-13

## 2020-07-13 NOTE — TELEPHONE ENCOUNTER
Reason for call:  Patient reporting a symptom    Symptom or request: Abdominal Pain, diarrhea, nausea after eating, decreased appetite     Duration (how long have symptoms been present): x 3 weeks     Have you been treated for this before? No    Additional comments:     Phone Number patient can be reached at:  Home number on file [phone] (home)    Best Time:  Anytime     Can we leave a detailed message on this number:  YES    Call taken on 7/13/2020 at 3:52 PM by Haley Alberto     V-Y Plasty Text: The defect edges were debeveled with a #15 scalpel blade.  Given the location of the defect, shape of the defect and the proximity to free margins an V-Y advancement flap was deemed most appropriate.  Using a sterile surgical marker, an appropriate advancement flap was drawn incorporating the defect and placing the expected incisions within the relaxed skin tension lines where possible.    The area thus outlined was incised deep to adipose tissue with a #15 scalpel blade.  The skin margins were undermined to an appropriate distance in all directions utilizing iris scissors.

## 2020-07-13 NOTE — TELEPHONE ENCOUNTER
"Pt would like to be seen and RN agrees with pt since this has been on going for 3 weeks     Advised to be seen within 24-48 hours     Patient stated an understanding and agreed with plan.    Marsha Aragon RN, BSN  [facility] Triage    Additional Information    Negative: Passed out (i.e., fainted, collapsed and was not responding)    Negative: Shock suspected (e.g., cold/pale/clammy skin, too weak to stand, low BP, rapid pulse)    Negative: Sounds like a life-threatening emergency to the triager    Negative: Chest pain    Negative: Pain is mainly in upper abdomen (if needed ask: 'is it mainly above the belly button?')    Negative: Abdominal pain and pregnant > 20 weeks    Negative: Abdominal pain and pregnant < 20 weeks    Negative: SEVERE abdominal pain (e.g., excruciating)    Negative: Vomiting red blood or black (coffee ground) material    Negative: Bloody, black, or tarry bowel movements    Negative: Constant abdominal pain lasting > 2 hours    Negative: Vomiting bile (green color)    Negative: Patient sounds very sick or weak to the triager    Negative: Vomiting and abdomen looks much more swollen than usual    Negative: White of the eyes have turned yellow (i.e., jaundice)    Negative: Blood in urine (red, pink, or tea-colored)    Negative: Fever > 103 F (39.4 C)    Negative: Fever > 101 F (38.3 C) and over 60 years of age    Negative: Fever > 100.0 F (37.8 C) and has diabetes mellitus or a weak immune system (e.g., HIV positive, cancer chemotherapy, organ transplant, splenectomy, chronic steroids)    Negative: Fever > 100.0 F (37.8 C) and bedridden (e.g., nursing home patient, stroke, chronic illness, recovering from surgery)    Negative: Pregnant or could be pregnant (i.e., missed last menstrual period)    MODERATE OR MILD pain that comes and goes (cramps) lasts > 24 hours    Answer Assessment - Initial Assessment Questions  1. LOCATION: \"Where does it hurt?\"       In the middle of the abdomen     2. " "RADIATION: \"Does the pain shoot anywhere else?\" (e.g., chest, back)      None     3. ONSET: \"When did the pain begin?\" (e.g., minutes, hours or days ago)       3 weeks  And comes and goes lasts a few hours then it will get better, then it gets worse when she is hungry but cannot eat because she is too nauseated     4. SUDDEN: \"Gradual or sudden onset?\"      Gradual     5. PATTERN \"Does the pain come and go, or is it constant?\"     - If constant: \"Is it getting better, staying the same, or worsening?\"       (Note: Constant means the pain never goes away completely; most serious pain is constant and it progresses)      - If intermittent: \"How long does it last?\" \"Do you have pain now?\"      (Note: Intermittent means the pain goes away completely between bouts)      Comes and goes     6. SEVERITY: \"How bad is the pain?\"  (e.g., Scale 1-10; mild, moderate, or severe)    - MILD (1-3): doesn't interfere with normal activities, abdomen soft and not tender to touch     - MODERATE (4-7): interferes with normal activities or awakens from sleep, tender to touch     - SEVERE (8-10): excruciating pain, doubled over, unable to do any normal activities       Mild     7. RECURRENT SYMPTOM: \"Have you ever had this type of abdominal pain before?\" If so, ask: \"When was the last time?\" and \"What happened that time?\"       Happened a few months ago and it just went away on its own     8. CAUSE: \"What do you think is causing the abdominal pain?\"      Unknown     9. RELIEVING/AGGRAVATING FACTORS: \"What makes it better or worse?\" (e.g., movement, antacids, bowel movement)      Eating - can make pain worse but after it gets better, she has some diarrhea       10. OTHER SYMPTOMS: \"Has there been any vomiting, diarrhea, constipation, or urine problems?\"        Diarrhea and dry heaves   11. PREGNANCY: \"Is there any chance you are pregnant?\" \"When was your last menstrual period?\"        4-5 months ago - was just in to see her OB and they tested " for pregnancy which was negative    Protocols used: ABDOMINAL PAIN - FEMALE-A-OH

## 2020-07-14 NOTE — PROGRESS NOTES
Subjective     Faustina Lubin is a 29 year old female who presents to clinic today for the following health issues:    HPI   ABDOMINAL   PAIN     Onset: x 2 weeks    Description:   Character: Cramping and bloating  Location: center of stomach  Radiation: None    Intensity: mild    Progression of Symptoms:  same and intermittent    Accompanying Signs & Symptoms:  Fever/Chills?: no   Gas/Bloating: YES  Nausea: YES  Vomitting: no   Diarrhea?: no   Constipation:no   Dysuria or Hematuria: no    History:   Trauma: no   Previous similar pain: no    Previous tests done: none    Precipitating factors:   Does the pain change with:     Food: YES     BM: YES    Urination: no     Alleviating factors:      Therapies Tried and outcome:none    LMP:       Faustina is here with GI symptoms.  Started about 3 weeks ago. She has nausea, pain and bloating.  Every time she eats seems to get abdominal pain/bloating feeling and nausea.  She subsequently has had a decreased appetite.  Fruits and vegetables seem to sit a little better.  She had one episode of vomiting last week, though she was laying particularly anxious at that time so not sure if that was contributing.  Her bowel movements have been normal to softer loose, occasionally some diarrhea.  No fevers or chills.  She had a similar episode a couple months ago that only lasted a few days.     Her last period was 3 or 4 months ago.  She did see her OB/GYN a couple weeks ago, after the symptoms have been present for a few days.  Her pregnancy test at that time was negative, and she changed to a different birth control.    She works as a , recently changed to the night shift.     Her friends encouraged her to check for gluten intolerance.     Reviewed and updated as needed this visit by Provider         Review of Systems   Const, GI,  reviewed,  otherwise negative unless noted above.        Objective    /64   Pulse 104   Temp 99.1  F (37.3  C)  (Tympanic)   Wt 58.1 kg (128 lb)   LMP 02/12/2020 (Approximate)   SpO2 97%   BMI 22.14 kg/m    Body mass index is 22.14 kg/m .  Physical Exam   Gen: well appearing, pleasant woman, no distress  HEENT: PERRL, sclera nonicteric, MMM  Pulm: breathing comfortably, CTAB, no wheezes or rales  CV: RRR, normal S1 and S2, no murmurs  Abd: BS present, soft, nontender, nondistended  Ext: 2+ distal pulses, no LE edema              Assessment & Plan       ICD-10-CM    1. Abdominal bloating  R14.0 Tissue transglutaminase klever IgA and IgG     IgA     Helicobacter pylori Antigen Stool   2. Diarrhea, unspecified type  R19.7 Tissue transglutaminase klever IgA and IgG     IgA     Helicobacter pylori Antigen Stool   3. Nausea  R11.0 ondansetron (ZOFRAN) 4 MG tablet        Unclear etiology. Atypical viral illness vs food intolerance vs H pylor. Unlikely due to her new OCP since symptoms started prior.  Had negative pregnancy test after symptoms started.  Will check celiac and H pylori. Recommended daily omeprazole for 2-4 weeks. zofran as needed.  If that isn't helping and lab work is unrevealing, consider dairy elimination.       Return in about 4 weeks (around 8/12/2020) for If no improvement.    Zeny Stephens MD  [facility] [place]

## 2020-07-15 ENCOUNTER — OFFICE VISIT (OUTPATIENT)
Dept: FAMILY MEDICINE | Facility: CLINIC | Age: 30
End: 2020-07-15
Payer: COMMERCIAL

## 2020-07-15 VITALS
SYSTOLIC BLOOD PRESSURE: 112 MMHG | BODY MASS INDEX: 22.14 KG/M2 | HEART RATE: 104 BPM | DIASTOLIC BLOOD PRESSURE: 64 MMHG | WEIGHT: 128 LBS | TEMPERATURE: 99.1 F | OXYGEN SATURATION: 97 %

## 2020-07-15 DIAGNOSIS — R11.0 NAUSEA: ICD-10-CM

## 2020-07-15 DIAGNOSIS — R14.0 ABDOMINAL BLOATING: Primary | ICD-10-CM

## 2020-07-15 DIAGNOSIS — R19.7 DIARRHEA, UNSPECIFIED TYPE: ICD-10-CM

## 2020-07-15 PROCEDURE — 83516 IMMUNOASSAY NONANTIBODY: CPT | Performed by: INTERNAL MEDICINE

## 2020-07-15 PROCEDURE — 36415 COLL VENOUS BLD VENIPUNCTURE: CPT | Performed by: INTERNAL MEDICINE

## 2020-07-15 PROCEDURE — 83516 IMMUNOASSAY NONANTIBODY: CPT | Mod: 59 | Performed by: INTERNAL MEDICINE

## 2020-07-15 PROCEDURE — 99214 OFFICE O/P EST MOD 30 MIN: CPT | Performed by: INTERNAL MEDICINE

## 2020-07-15 PROCEDURE — 82784 ASSAY IGA/IGD/IGG/IGM EACH: CPT | Performed by: INTERNAL MEDICINE

## 2020-07-15 RX ORDER — ONDANSETRON 4 MG/1
4 TABLET, FILM COATED ORAL EVERY 8 HOURS PRN
Qty: 20 TABLET | Refills: 3 | Status: SHIPPED | OUTPATIENT
Start: 2020-07-15 | End: 2020-10-02

## 2020-07-15 RX ORDER — LEVONORGESTREL AND ETHINYL ESTRADIOL 0.1-0.02MG
KIT ORAL
COMMUNITY
Start: 2020-06-30 | End: 2021-08-19

## 2020-07-15 NOTE — PATIENT INSTRUCTIONS
Try omeprazole 20mg once daily in the morning (after you have left the sample for H pylori)    Take zofran as needed for nausea.     If that isn't helping, try to eliminate all dairy

## 2020-07-16 DIAGNOSIS — R14.0 ABDOMINAL BLOATING: ICD-10-CM

## 2020-07-16 DIAGNOSIS — R19.7 DIARRHEA, UNSPECIFIED TYPE: ICD-10-CM

## 2020-07-16 LAB — IGA SERPL-MCNC: 375 MG/DL (ref 84–499)

## 2020-07-16 PROCEDURE — 87338 HPYLORI STOOL AG IA: CPT | Performed by: INTERNAL MEDICINE

## 2020-07-17 LAB
H PYLORI AG STL QL IA: NEGATIVE
TTG IGA SER-ACNC: 1 U/ML
TTG IGG SER-ACNC: 1 U/ML

## 2020-09-10 LAB
CREAT SERPL-MCNC: 0.78 MG/DL (ref 0.57–1.11)
GFR SERPL CREATININE-BSD FRML MDRD: >60 ML/MIN/1.73M2
GLUCOSE SERPL-MCNC: 91 MG/DL (ref 65–100)
POTASSIUM SERPL-SCNC: 3.8 MMOL/L (ref 3.5–5)

## 2020-09-17 ENCOUNTER — VIRTUAL VISIT (OUTPATIENT)
Dept: FAMILY MEDICINE | Facility: CLINIC | Age: 30
End: 2020-09-17
Payer: COMMERCIAL

## 2020-09-17 DIAGNOSIS — F41.9 ANXIETY: ICD-10-CM

## 2020-09-17 DIAGNOSIS — R19.7 DIARRHEA, UNSPECIFIED TYPE: Primary | ICD-10-CM

## 2020-09-17 PROCEDURE — 99213 OFFICE O/P EST LOW 20 MIN: CPT | Mod: TEL | Performed by: PHYSICIAN ASSISTANT

## 2020-09-17 NOTE — PROGRESS NOTES
"Faustina Lubin is a 29 year old female who is being evaluated via a billable telephone visit.      The patient has been notified of following:     \"This telephone visit will be conducted via a call between you and your physician/provider. We have found that certain health care needs can be provided without the need for a physical exam.  This service lets us provide the care you need with a short phone conversation.  If a prescription is necessary we can send it directly to your pharmacy.  If lab work is needed we can place an order for that and you can then stop by our lab to have the test done at a later time.    Telephone visits are billed at different rates depending on your insurance coverage. During this emergency period, for some insurers they may be billed the same as an in-person visit.  Please reach out to your insurance provider with any questions.    If during the course of the call the physician/provider feels a telephone visit is not appropriate, you will not be charged for this service.\"    Patient has given verbal consent for Telephone visit?  Yes    What phone number would you like to be contacted at? [phone]    How would you like to obtain your AVS? Mail a copy    Subjective     Faustina Lubin is a 29 year old female who presents via phone visit today for the following health issues:    HPI    Patient is concerned about having botulism - ate a baked potato last night that had been sitting in aluminum foil for a few days, says it tasted off so pt googled how long a baked potato lasts and it brought up an article for botulism. She is very concerned she might have the beginning symptoms of botulism and is wondering if there is a way to check for it.         Faustina is a 30 y/o female with a PMH anxiety who presents with 1 day hx of diarrhea that began last night after eating a baked potato that had been left in the refrigerator x 1 week. She reports 3-4 episodes of diarrhea today, no  " Vomiting, abdominal pain, or nausea, no fevers.  No other new food exposures or sick contacts. No recent antibiotic use.  She was           Review of Systems   Constitutional, HEENT, cardiovascular, pulmonary, GI, , musculoskeletal, neuro, skin, endocrine and psych systems are negative, except as otherwise noted.       Objective          Vitals:  No vitals were obtained today due to virtual visit.    healthy, alert and no distress  PSYCH: Alert and oriented times 3; coherent speech, normal   rate and volume, able to articulate logical thoughts, able   to abstract reason, no tangential thoughts, no hallucinations   or delusions  Her affect is normal  RESP: No cough, no audible wheezing, able to talk in full sentences  Remainder of exam unable to be completed due to telephone visits            Assessment/Plan:    Assessment & Plan     1. Diarrhea, unspecified type  1 day hx of diarrhea after eating refrigerated leftovers.  Advise that she eat a bland diet and drink plenty of fluids and monitor her symptoms.  Reassured her that her symptoms are not consistent with Botulism and advised that she monitoring for any worsening.  If diarrhea persists, will consider stool cultures.  She is agreeable.     2. Anxiety  encouraged her to follow up regarding her anxiety symptoms if/when ready.  She has seen a therapist in the past and may be interested in working with one again in the future.          F[place]    No follow-ups on file.    Isauro Hatfield PA-C  [facility] [place]    Phone call duration:  15 minutes

## 2020-09-22 ENCOUNTER — OFFICE VISIT (OUTPATIENT)
Dept: FAMILY MEDICINE | Facility: CLINIC | Age: 30
End: 2020-09-22
Payer: COMMERCIAL

## 2020-09-22 VITALS
TEMPERATURE: 97.7 F | WEIGHT: 128.4 LBS | OXYGEN SATURATION: 96 % | BODY MASS INDEX: 21.92 KG/M2 | SYSTOLIC BLOOD PRESSURE: 112 MMHG | DIASTOLIC BLOOD PRESSURE: 64 MMHG | HEART RATE: 91 BPM | RESPIRATION RATE: 15 BRPM | HEIGHT: 64 IN

## 2020-09-22 DIAGNOSIS — B96.89 BACTERIAL VAGINOSIS: ICD-10-CM

## 2020-09-22 DIAGNOSIS — Z23 NEED FOR PROPHYLACTIC VACCINATION AND INOCULATION AGAINST INFLUENZA: ICD-10-CM

## 2020-09-22 DIAGNOSIS — F41.9 ANXIETY: Primary | ICD-10-CM

## 2020-09-22 DIAGNOSIS — N92.6 IRREGULAR MENSES: ICD-10-CM

## 2020-09-22 DIAGNOSIS — N76.0 BACTERIAL VAGINOSIS: ICD-10-CM

## 2020-09-22 DIAGNOSIS — F32.0 MILD SINGLE CURRENT EPISODE OF MAJOR DEPRESSIVE DISORDER (H): ICD-10-CM

## 2020-09-22 DIAGNOSIS — N89.8 VAGINAL DISCHARGE: ICD-10-CM

## 2020-09-22 LAB
HCG UR QL: NEGATIVE
SPECIMEN SOURCE: ABNORMAL
WET PREP SPEC: ABNORMAL

## 2020-09-22 PROCEDURE — 81025 URINE PREGNANCY TEST: CPT | Performed by: PHYSICIAN ASSISTANT

## 2020-09-22 PROCEDURE — 90686 IIV4 VACC NO PRSV 0.5 ML IM: CPT | Performed by: PHYSICIAN ASSISTANT

## 2020-09-22 PROCEDURE — 90471 IMMUNIZATION ADMIN: CPT | Performed by: PHYSICIAN ASSISTANT

## 2020-09-22 PROCEDURE — 87210 SMEAR WET MOUNT SALINE/INK: CPT | Performed by: PHYSICIAN ASSISTANT

## 2020-09-22 PROCEDURE — 99214 OFFICE O/P EST MOD 30 MIN: CPT | Mod: 25 | Performed by: PHYSICIAN ASSISTANT

## 2020-09-22 RX ORDER — SERTRALINE HYDROCHLORIDE 25 MG/1
25 TABLET, FILM COATED ORAL DAILY
Qty: 90 TABLET | Refills: 1 | Status: SHIPPED | OUTPATIENT
Start: 2020-09-22 | End: 2020-12-09 | Stop reason: SINTOL

## 2020-09-22 RX ORDER — METRONIDAZOLE 500 MG/1
500 TABLET ORAL 2 TIMES DAILY
Qty: 14 TABLET | Refills: 0 | Status: SHIPPED | OUTPATIENT
Start: 2020-09-22 | End: 2020-09-29

## 2020-09-22 ASSESSMENT — MIFFLIN-ST. JEOR: SCORE: 1293.45

## 2020-09-22 NOTE — PROGRESS NOTES
Subjective     Faustina Lubin is a 29 year old female who presents to clinic today for the following health issues:    HPI       Depression and Anxiety Follow-Up    How are you doing with your depression since your last visit? Worsened     How are you doing with your anxiety since your last visit?  Worsened     Are you having other symptoms that might be associated with depression or anxiety? Yes:  sleeping     Have you had a significant life event? No     Do you have any concerns with your use of alcohol or other drugs? No    Social History     Tobacco Use     Smoking status: Never Smoker     Smokeless tobacco: Never Used   Substance Use Topics     Alcohol use: Yes     Alcohol/week: 0.0 - 1.0 standard drinks     Comment: less than one per week      Drug use: No     PHQ 6/12/2018 7/8/2019 9/23/2020   PHQ-9 Total Score 6 0 11   Q9: Thoughts of better off dead/self-harm past 2 weeks Not at all Not at all Not at all     VICKIE-7 SCORE 6/12/2018 7/8/2019 9/23/2020   Total Score 4 2 10     Last PHQ-9 9/23/2020   1.  Little interest or pleasure in doing things 2   2.  Feeling down, depressed, or hopeless 1   3.  Trouble falling or staying asleep, or sleeping too much 2   4.  Feeling tired or having little energy 2   5.  Poor appetite or overeating 2   6.  Feeling bad about yourself 0   7.  Trouble concentrating 1   8.  Moving slowly or restless 1   Q9: Thoughts of better off dead/self-harm past 2 weeks 0   PHQ-9 Total Score 11   Difficulty at work, home, or with people Somewhat difficult     VICKIE-7  9/23/2020   1. Feeling nervous, anxious, or on edge 2   2. Not being able to stop or control worrying 2   3. Worrying too much about different things 2   4. Trouble relaxing 1   5. Being so restless that it is hard to sit still 0   6. Becoming easily annoyed or irritable 1   7. Feeling afraid, as if something awful might happen 2   VICKIE-7 Total Score 10   If you checked any problems, how difficult have they made it for you to  "do your work, take care of things at home, or get along with other people? Somewhat difficult       Suicide Assessment Five-step Evaluation and Treatment (SAFE-T)      How many servings of fruits and vegetables do you eat daily?  0-1    On average, how many sweetened beverages do you drink each day (Examples: soda, juice, sweet tea, etc.  Do NOT count diet or artificially sweetened beverages)?   0    How many days per week do you exercise enough to make your heart beat faster? 3 or less    How many minutes a day do you exercise enough to make your heart beat faster? 9 or less    How many days per week do you miss taking your medication? 0    Vaginal Symptoms  Onset/Duration: 1-1 1/2 week  Description:  Vaginal Discharge: none   Itching (Pruritis): YES  Burning sensation:  no  Odor: YES  Accompanying Signs & Symptoms:  Urinary symptoms: no  Abdominal pain: no  Fever: no  History:   Sexually active: YES  New Partner: YES  Possibility of Pregnancy:  no  Recent antibiotic use: no  Previous vaginitis issues: YES  Precipitating or alleviating factors: worse with intercourse   Therapies tried and outcome: none    Faustina presents to the clinic to discuss her ongoing symptom of anxiety. Specifically, she becomes very anxious at any  sign of illness or ache/pain and worries significantly about her health.  She reports that she often \" googles\" her symptoms and worries significantly about having a serious illness.  This causes her to feel panic and often loose sleep.  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 She sees a therapist but would like to discuss medication options.      Vaginal discharge for 1 week.  Has recently been treated for yeast infection with diflucan by her OBGYN, feels like symptoms have returned.  She is also having associated vaginal burning.       Recently switched birth control pills due to irregular menses with previous pill.  Has been on new pill now " "x 1 month but missed her period again.      Review of Systems   Constitutional, HEENT, cardiovascular, pulmonary, GI, , musculoskeletal, neuro, skin, endocrine and psych systems are negative, except as otherwise noted.      Objective    /64   Pulse 91   Temp 97.7  F (36.5  C) (Tympanic)   Resp 15   Ht 1.619 m (5' 3.75\")   Wt 58.2 kg (128 lb 6.4 oz)   LMP 08/14/2020 (Approximate)   SpO2 96%   BMI 22.21 kg/m    Body mass index is 22.21 kg/m .  Physical Exam   GENERAL: healthy, alert and no distress  EYES: Eyes grossly normal to inspection, PERRL and conjunctivae and sclerae normal  HENT: ear canals and TM's normal, nose and mouth without ulcers or lesions  NECK: no adenopathy, no asymmetry, masses, or scars and thyroid normal to palpation  CV: regular rate and rhythm, normal S1 S2, no S3 or S4, no murmur   (female): normal female external genitalia, normal urethral meatus, vaginal mucosa, normal cervix/adnexa/uterus without masses or discharge  PSYCH: mentation appears normal, affect normal/bright    Results for orders placed or performed in visit on 09/22/20   HCG Qual, Urine ([id number])     Status: None   Result Value Ref Range    HCG Qual Urine Negative NEG^Negative   Wet prep     Status: Abnormal    Specimen: Vagina   Result Value Ref Range    Specimen Description Vagina     Wet Prep No Trichomonas seen     Wet Prep Many  Clue cells seen   (A)     Wet Prep No yeast seen     Wet Prep Moderate  WBC'S seen              Assessment & Plan     1. Anxiety  Faustina seems to be experiencing significant symptoms of anxiety that are affecting her ability to work and  Sleep well. She would benefit from starting an selective serotonin reuptake inhibitor at this time as well as continuing to work with her therapist.   - sertraline (ZOLOFT) 25 MG tablet; Take 1 tablet (25 mg) by mouth daily  Dispense: 90 tablet; Refill: 1    2. Mild single current episode of major depressive disorder (H)    3. Bacterial " vaginosis  - metroNIDAZOLE (FLAGYL) 500 MG tablet; Take 1 tablet (500 mg) by mouth 2 times daily for 7 days  Dispense: 14 tablet; Refill: 0    4. Vaginal discharge  - Wet prep    5. Irregular menses  - HCG Qual, Urine ([id number])    6. Need for prophylactic vaccination and inoculation against influenza  - INFLUENZA VACCINE IM > 6 MONTHS VALENT IIV4 [02308]  - Vaccine Administration, Initial [17705]             Return in about 1 month (around 10/22/2020) for med check.    Isauro Hatfield PA-C  [facility] [place]

## 2020-09-23 ENCOUNTER — TELEPHONE (OUTPATIENT)
Dept: FAMILY MEDICINE | Facility: CLINIC | Age: 30
End: 2020-09-23

## 2020-09-23 ASSESSMENT — ANXIETY QUESTIONNAIRES
5. BEING SO RESTLESS THAT IT IS HARD TO SIT STILL: NOT AT ALL
3. WORRYING TOO MUCH ABOUT DIFFERENT THINGS: MORE THAN HALF THE DAYS
GAD7 TOTAL SCORE: 10
2. NOT BEING ABLE TO STOP OR CONTROL WORRYING: MORE THAN HALF THE DAYS
1. FEELING NERVOUS, ANXIOUS, OR ON EDGE: MORE THAN HALF THE DAYS
6. BECOMING EASILY ANNOYED OR IRRITABLE: SEVERAL DAYS
7. FEELING AFRAID AS IF SOMETHING AWFUL MIGHT HAPPEN: MORE THAN HALF THE DAYS
IF YOU CHECKED OFF ANY PROBLEMS ON THIS QUESTIONNAIRE, HOW DIFFICULT HAVE THESE PROBLEMS MADE IT FOR YOU TO DO YOUR WORK, TAKE CARE OF THINGS AT HOME, OR GET ALONG WITH OTHER PEOPLE: SOMEWHAT DIFFICULT

## 2020-09-23 ASSESSMENT — PATIENT HEALTH QUESTIONNAIRE - PHQ9
5. POOR APPETITE OR OVEREATING: SEVERAL DAYS
SUM OF ALL RESPONSES TO PHQ QUESTIONS 1-9: 11

## 2020-09-23 NOTE — TELEPHONE ENCOUNTER
Pt called back and was given Isauro's note below.    Pt states understanding.    Sowmya TALBOT RN  EP Triage

## 2020-09-23 NOTE — TELEPHONE ENCOUNTER
Non detailed message left for pt to return call to clinic and ask to speak with a triage nurse.    Sowmya TALBOT RN  EP Triage

## 2020-09-24 ASSESSMENT — ANXIETY QUESTIONNAIRES: GAD7 TOTAL SCORE: 10

## 2020-09-25 ENCOUNTER — TELEPHONE (OUTPATIENT)
Dept: FAMILY MEDICINE | Facility: CLINIC | Age: 30
End: 2020-09-25

## 2020-09-25 NOTE — TELEPHONE ENCOUNTER
Pt calling was started on sertraline 25 mg and metronidazole for bv on 9/22.  Pt states she has been having a hard time sleeping at night.  Last night was up all night and the night before only got a couple of hours.   Also states her birth control was changed in July and is on a lower dosing and states since changing ocps her anxiety has increased.  Wondering if not sleeping could be related to meds?  Wondering if can go back to the Apri that she was on if the ocp is causing the increase in anxiety?    Pharmacy pended.    Pt can be reached at [phone] ok to leave detailed message.    Sowmya TALBOT RN  EP Triage

## 2020-09-25 NOTE — TELEPHONE ENCOUNTER
I dont think that the new OCP is the cause of the anxiety since the anxiety has been ongoing for a while although I feel like it is too early to tell.  If she is agreeable I recommend that she continue the Zoloft over the next 2-3 weeks because it can take a few weeks to begin to be effective. There are several SE listed on the medication but generally this medication is well tolerated.  If she should have suicidal thoughts she needs to stop the medication and call the clinic immediately or go to the ER if severe.

## 2020-09-25 NOTE — TELEPHONE ENCOUNTER
"Returned call to patient and advised of providers message.  Patient states she wonders if the change in OCP has noticed an increase in anxiety.  Patient is wondering if the OCP is causing the anxiety which is causing her to need the sertraline.  Patient works a day/night shift so was trying to figure out a time that she would be up consistently to take medication so was taking it at 3 or 4 pm.  Patient states she hasn't slept much the past two nights and is just \"miserable\" and having a lot of anxiety.  Patient was reading the side effects of medication and started worrying about all the side effects she could develop such as SI, stating, \" what if I start having suicidal thoughts\".      Patient just started this new OCP two days ago and writer asked if that's when her anxiety started and she said, \"no, it started a few months ago, but its just worsened the last two days\".    Advised patient to try and take medication at the same time every day and not based on when she is up/working.    Routing to provider for review and advise as appropriate.      MOO ConnellN, RN  [facility] Triage          "

## 2020-09-25 NOTE — TELEPHONE ENCOUNTER
S/w pt and gave Isauro's reply below.    Pt will continue the sertraline and states understanding.    Sowmya TALBOT RN  EP Triage

## 2020-09-25 NOTE — TELEPHONE ENCOUNTER
Is she taking the Zoloft at night or in the morning?  If at night, she should take this in the morning instead.  It could be a SE of Zoloft and I would expect this to improve as the Zoloft begins to work over the next few weeks. Lets keep her OCP the same over the next few weeks so that we dont change too much at once.  She should follow up in 2-4 weeks with an update

## 2020-09-25 NOTE — TELEPHONE ENCOUNTER
Reason for Call:  Other     Detailed comments:  Pt called to speak with Emmie Hatfield re her antidepressing med she is on right now and also her birth control med, she would like to go back to the same medication for birth control she use to be, pt thinks the new med could give her more anxiety and she could not sleep in last two nights at all.    Phone Number Patient can be reached at: Home number on file [phone] (home)    Best Time: anytime    Can we leave a detailed message on this number? YES    Call taken on 9/25/2020 at 7:10 AM by Mckayla Allan

## 2020-09-29 ENCOUNTER — TELEPHONE (OUTPATIENT)
Dept: FAMILY MEDICINE | Facility: CLINIC | Age: 30
End: 2020-09-29

## 2020-09-29 NOTE — TELEPHONE ENCOUNTER
First day or two of medication not that bad. Friday a little bit of diarrhea. Saturday any time ate had diarrhea. Sunday vomited 2.5 hours after medication. This morning vomited. Threw up last dose of Metronidazole.    No fever. Has not been in contact with anyone who has been sick. Since Friday only toasts, apple sauce, bananas, noodles, Pedialyte.    Patient works at the nursing home. Has not gone to work due to feeling sick. Last worked Tuesday. Went to training on Friday.    Today has vomited once. 2-3 loose stools so far today.    Please advise. Triage to call the patient back.  Jacqueline Snow RN

## 2020-09-29 NOTE — TELEPHONE ENCOUNTER
Medication including sertraline is known to cause GI symptoms including diarrhea.  She is on a very small dose if she want a start taking every other day for a week and then go to once daily that will help . Taking medication with food will help as well if symptoms still continue she should contact us back.

## 2020-09-29 NOTE — TELEPHONE ENCOUNTER
S/w pt and gave Dr. Young's reply below.  Pt states understanding and will try the every other day to see if helps with sxs.    Sowmya TALBOT RN  EP Triage

## 2020-09-29 NOTE — TELEPHONE ENCOUNTER
Patient calling re: symptoms she is having, maybe related to her new medication Sertraline.  Patient is having insomnia, up for 30 hours every other day. Increase anxiety. diarhea 6-7x a day. Vomittied 2x over the weekend.  Patient mentioned she has also been on an antibiotic 7x days now. Took her last dose this morning.    Please advise  [phone] (home)   Ok to leave detailed message: yes  Thank you  Khadra Goss     12.1   4.35  )-----------( 219      ( 18 Mar 2020 14:30 )             36.8       03-18    138  |  100  |  11  ----------------------------<  134<H>  3.8   |  23  |  0.90    Ca    9.2      18 Mar 2020 14:30    TPro  7.4  /  Alb  4.2  /  TBili  0.4  /  DBili  x   /  AST  23  /  ALT  10  /  AlkPhos  79  03-18    Lactate Trend    CAPILLARY BLOOD GLUCOSE    < from: Xray Chest 1 View- PORTABLE-Urgent (03.18.20 @ 14:21) >    Findings: The cardiac silhouette is normal in size. There are no focal consolidations or pleural effusions. The hilar and mediastinal structures appear unremarkable. The osseous measures are intact.    Impression: Clear lungs.    EKG: personally reviewed, 12.1   4.35  )-----------( 219      ( 18 Mar 2020 14:30 )             36.8       03-18    138  |  100  |  11  ----------------------------<  134<H>  3.8   |  23  |  0.90    Ca    9.2      18 Mar 2020 14:30    TPro  7.4  /  Alb  4.2  /  TBili  0.4  /  DBili  x   /  AST  23  /  ALT  10  /  AlkPhos  79  03-18    Lactate Trend    CAPILLARY BLOOD GLUCOSE    < from: Xray Chest 1 View- PORTABLE-Urgent (03.18.20 @ 14:21) >    Findings: The cardiac silhouette is normal in size. There are no focal consolidations or pleural effusions. The hilar and mediastinal structures appear unremarkable. The osseous measures are intact.    Impression: Clear lungs.    EKG: not in chart, per ED note, NSR 12.1   4.35  )-----------( 219      ( 18 Mar 2020 14:30 )             36.8       03-18    138  |  100  |  11  ----------------------------<  134<H>  3.8   |  23  |  0.90    Ca    9.2      18 Mar 2020 14:30    TPro  7.4  /  Alb  4.2  /  TBili  0.4  /  DBili  x   /  AST  23  /  ALT  10  /  AlkPhos  79  03-18    Lactate Trend    CAPILLARY BLOOD GLUCOSE    Imaging personally reviewed.   < from: Xray Chest 1 View- PORTABLE-Urgent (03.18.20 @ 14:21) >    Findings: The cardiac silhouette is normal in size. There are no focal consolidations or pleural effusions. The hilar and mediastinal structures appear unremarkable. The osseous measures are intact.    Impression: Clear lungs.    EKG: not in chart, per ED note, NSR

## 2020-10-02 ENCOUNTER — OFFICE VISIT (OUTPATIENT)
Dept: FAMILY MEDICINE | Facility: CLINIC | Age: 30
End: 2020-10-02
Payer: COMMERCIAL

## 2020-10-02 VITALS
SYSTOLIC BLOOD PRESSURE: 100 MMHG | DIASTOLIC BLOOD PRESSURE: 60 MMHG | BODY MASS INDEX: 21.45 KG/M2 | WEIGHT: 124 LBS | OXYGEN SATURATION: 96 % | TEMPERATURE: 98.5 F | HEART RATE: 97 BPM

## 2020-10-02 DIAGNOSIS — F41.9 ANXIETY: Primary | ICD-10-CM

## 2020-10-02 DIAGNOSIS — F51.04 PSYCHOPHYSIOLOGICAL INSOMNIA: ICD-10-CM

## 2020-10-02 PROCEDURE — 99214 OFFICE O/P EST MOD 30 MIN: CPT | Performed by: PHYSICIAN ASSISTANT

## 2020-10-02 ASSESSMENT — ANXIETY QUESTIONNAIRES
7. FEELING AFRAID AS IF SOMETHING AWFUL MIGHT HAPPEN: NEARLY EVERY DAY
6. BECOMING EASILY ANNOYED OR IRRITABLE: MORE THAN HALF THE DAYS
2. NOT BEING ABLE TO STOP OR CONTROL WORRYING: NEARLY EVERY DAY
1. FEELING NERVOUS, ANXIOUS, OR ON EDGE: NEARLY EVERY DAY
GAD7 TOTAL SCORE: 20
3. WORRYING TOO MUCH ABOUT DIFFERENT THINGS: NEARLY EVERY DAY
5. BEING SO RESTLESS THAT IT IS HARD TO SIT STILL: NEARLY EVERY DAY
IF YOU CHECKED OFF ANY PROBLEMS ON THIS QUESTIONNAIRE, HOW DIFFICULT HAVE THESE PROBLEMS MADE IT FOR YOU TO DO YOUR WORK, TAKE CARE OF THINGS AT HOME, OR GET ALONG WITH OTHER PEOPLE: EXTREMELY DIFFICULT

## 2020-10-02 ASSESSMENT — PATIENT HEALTH QUESTIONNAIRE - PHQ9
SUM OF ALL RESPONSES TO PHQ QUESTIONS 1-9: 18
5. POOR APPETITE OR OVEREATING: NEARLY EVERY DAY
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
## 2020-10-02 NOTE — PATIENT INSTRUCTIONS
Patient Education     Treating Insomnia     Learning to relax before bedtime can improve your sleep.   Good sleeping habits are a key part of treatment. If needed, some medicines may help you sleep better at first. Making healthy lifestyle changes and learning to relax can improve your sleep. Treating insomnia takes commitment. But trust that your efforts will pay off. Be sure to talk with your healthcare provider before taking any medicine.  Healthy lifestyle  Your lifestyle affects your health and your sleep. Here are some healthy habits:    Keep a regular sleep schedule. Go to bed and get up at the same time each day.    Exercise regularly. It may help you reduce stress. Avoid strenuous exercise for 2 to 4 hours before bedtime.    Avoid or limit naps, especially in the late afternoon.    Use your bed only for sleep and sex.    Don t spend too much time in bed trying to fall asleep. If you can t fall asleep, get up and do something (no electronics) until you become tired and drowsy.    Avoid or limit caffeine and nicotine for up to 6 hours before bedtime. They can keep you awake at night.     Also avoid alcohol for at least 4 to 6 hours before bedtime. It may help you fall asleep at first. But you will have more awakenings during the night. And your sleep will not be restful.  Before bedtime  To sleep better every night, try these tips:    Have a bedtime routine to let your body and mind know when it s time to sleep.    Think of going to bed as relaxing and enjoyable. Sleep will come sooner.    If your worries don t let you sleep, write them down in a diary. Then close it, and go to bed.    Make sure the room is not too hot or too cold. If it s not dark enough, an eye mask can help. If it s noisy, try using earplugs.    Don't eat a large meal just before bedtime.    Remove noises, bright lights, TVs, cell phones, and computers from your sleeping environment.    Use a comfortable mattress and pillow.  Learn to  relax  Stress, anxiety, and body tension may keep you awake at night. To unwind before bedtime, try a warm bath, meditation, or yoga. Also try the following:    Deep breathing. Sit or lie back in a chair. Take a slow, deep breath. Hold it for 5 counts. Then breathe out slowly through your mouth. Keep doing this until you feel relaxed.    Progressive muscle relaxation. Tense and then relax the muscles in your body as you breathe deeply. Start with your feet and work up your body to your neck and face.  Cognitive Behavioral Therapy (CBT)  CBT is the most effective treatment for long-term insomnia. It tries to address the underlying causes of your sleep problems, including your habits and how you think about sleep.  Individual Therapy  Fly Lubin PsyD, ZAMZAM  Insomnia   [facility]: [phone]    [facility]: [phone]  [facility]  Visit [email or web address] or call [phone] to find a clinic close to you.  Suggested resources  The resources below may help you relax. This list is for information only. [facility] is not responsible for the quality of services or the actions of any person or organization. There may be a fee to use some of these resources.  Insomnia treatment books  Jaye Mind by Karine Friend and Mere Reynaga (2013)  Overcoming Insomnia by Vignesh Milner and Karine Friend (2008)  Quiet Your Mind and Get to Sleep: Solutions to Insomnia for Those with Depression, Anxiety or Chronic Pain by Karine Friend and Mere Reynaga (2009)  No More Sleepless Nights by James Richter and Trisha Stiles (1996)  Say Jaye to Insomnia by Ney De Paz (2009)  The Insomnia Workbook by Marsha Martin and Sly Parr (2009)  The Insomnia Answer by Filippo Lawrence and Aaron Tovar (2006)  Stress management and relaxation books  The Relaxation and Stress Reduction Workbook by Daysi Oates,  Quiana Martin and Charbel Ramírez (2008)  Stress Management Workbook: Techniques and Self-Assessment Procedures by Roseanna Martinez and Varun Chin (1997)  A Mindfulness-Based Stress Reduction Workbook by Jesus Manuel Silva and Chantal Ludwig (2010)  The Complete Stress Management Workbook by Pedro Davila, Inderjit Erwin and Joshua Mina (1996)  Online programs  www.SHUTi.me (pronounced shut eye). There is a fee for this program.   www.sleepIO.com (pronounced sleep ee oh). There is a fee for this program.  Other online resources  Deep breathing and progressive muscle relaxation (PMR):    http://www.Scannx  Meditation:    www."[employer]ranLivongo Health    www.Tabletize.comguidedHostspotmeditationHostspotsite.com  Guided imagery:    http://www.Scannx    http://[employer]obiotix  (click on  Resource Library,  then choose  Meditation, Relaxation, Guided Imagery )  Apps for your mobile device:    Autogenic Training Progressive Muscle Relaxation by Grid [employer] Mariah    Calm: Meditation and Sleep Stories by Calm.com[employer] Inc.    [employer] World Wide Timer - Meditation Cathi by [employer].  This is not a prescription and these resources are optional. You must pay for any costs when using these resources. Please ask your insurance carrier if you can be reimbursed for these resources. If so, you are responsible for sending the needed details to your insurance carrier. These resources may also be tax deductible as medical expenses. Check with your .  Date Last Reviewed: 5/18/2018  Modifications clinically reviewed by Fly Lubin PsyD, ZAMZAM, [facility], Director of the Insomnia and Behavioral Sleep Health Program, [facility].    1913-6817 [employer]. [address] [place] [address], [place] [address]. All rights reserved. This information is not intended as a substitute for professional medical care. Always follow your healthcare professional's instructions. This information has been modified by  your health care provider with permission from the publisher.

## 2020-10-03 ASSESSMENT — ANXIETY QUESTIONNAIRES: GAD7 TOTAL SCORE: 20

## 2020-10-14 DIAGNOSIS — F41.9 ANXIETY: Primary | ICD-10-CM

## 2020-10-26 LAB
C TRACH DNA SPEC QL PROBE+SIG AMP: NEGATIVE
N GONORRHOEA DNA SPEC QL PROBE+SIG AMP: NEGATIVE
SPECIMEN DESCRIP: NORMAL
SPECIMEN DESCRIPTION: NORMAL

## 2020-11-20 ENCOUNTER — TRANSFERRED RECORDS (OUTPATIENT)
Dept: MULTI SPECIALTY CLINIC | Facility: CLINIC | Age: 30
End: 2020-11-20

## 2020-11-20 LAB — PAP-ABSTRACT: NORMAL

## 2020-12-09 ENCOUNTER — VIRTUAL VISIT (OUTPATIENT)
Dept: PSYCHIATRY | Facility: CLINIC | Age: 30
End: 2020-12-09
Payer: COMMERCIAL

## 2020-12-09 ENCOUNTER — VIRTUAL VISIT (OUTPATIENT)
Dept: BEHAVIORAL HEALTH | Facility: CLINIC | Age: 30
End: 2020-12-09
Payer: COMMERCIAL

## 2020-12-09 DIAGNOSIS — F39 MOOD DISORDER (H): ICD-10-CM

## 2020-12-09 DIAGNOSIS — F43.10 PTSD (POST-TRAUMATIC STRESS DISORDER): Primary | ICD-10-CM

## 2020-12-09 DIAGNOSIS — F41.1 GAD (GENERALIZED ANXIETY DISORDER): ICD-10-CM

## 2020-12-09 DIAGNOSIS — F41.1 GENERALIZED ANXIETY DISORDER: Primary | ICD-10-CM

## 2020-12-09 PROCEDURE — 90791 PSYCH DIAGNOSTIC EVALUATION: CPT | Mod: 52 | Performed by: SOCIAL WORKER

## 2020-12-09 PROCEDURE — 99204 OFFICE O/P NEW MOD 45 MIN: CPT | Mod: GT | Performed by: PSYCHIATRY & NEUROLOGY

## 2020-12-09 RX ORDER — HYDROXYZINE HYDROCHLORIDE 25 MG/1
25-50 TABLET, FILM COATED ORAL 3 TIMES DAILY PRN
Qty: 90 TABLET | Refills: 1 | Status: SHIPPED | OUTPATIENT
Start: 2020-12-09 | End: 2021-09-15 | Stop reason: ALTCHOICE

## 2020-12-09 RX ORDER — MIRTAZAPINE 15 MG/1
22.5 TABLET, FILM COATED ORAL AT BEDTIME
Qty: 45 TABLET | Refills: 0 | Status: SHIPPED | OUTPATIENT
Start: 2020-12-09 | End: 2021-02-01
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
## 2020-12-09 NOTE — PATIENT INSTRUCTIONS
Treatment Plan:    Increase mirtazapine/Remeron to 22.5 mg at bedtime for mood, anxiety, sleep    Start hydroxyzine 25-50 mg up to three times a day as needed for anxiety/sleep. Can break a tablet in half and take just 12.5 mg if 25 mg too sedating.     Discontinued sertraline due to side effects    If you would like me to collaborate with your therapist, please fill out/sign release of information form from [facility] website and upload to your [employer].    My fax number for any mental health forms that might need to be filled out: [phone]    Continue therapy as planned.    Continue all other medications as reviewed per electronic medical record today.     Safety plan reviewed. To the Emergency Department as needed or call after hours crisis line at [phone] or [phone]. [place] Crisis Text Line: Text [place] to 912819  or  Suicide LifeLine Chat: [email or web address]/chat    Schedule an appointment with me in 4-6 weeks or sooner as needed.  Call [facility] at [phone] to schedule.    Follow up with primary care provider as planned or sooner if needed for acute medical concerns.    Call the psychiatric nurse line with medication questions or concerns at [phone].    [employer] may be used to communicate with your provider, but this is not intended to be used for emergencies.    Community Resources:    National Suicide Prevention Lifeline: 563.776.3509 (TTY: 887.289.7921). Call anytime for help.  (www.suicidepreventionlifeline.org)  [place] [employer] Mental Illness (www.[place].org): 198.594.5349 or 658-119-0891.   Mental Health Association (www.mentalhealth.org): 110.417.9993 or 075-253-8020.  [place] Crisis Text Line: Text [place] to 131462  Suicide LifeLine Chat: [email or web address]/chat

## 2020-12-09 NOTE — Clinical Note
Please call this patient to get them scheduled for a follow-up visit in 4-6 weeks. Please schedule with me and the [facility] [place]. Thanks!
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
## 2021-01-15 ENCOUNTER — HEALTH MAINTENANCE LETTER (OUTPATIENT)
Age: 31
End: 2021-01-15

## 2021-02-01 ENCOUNTER — VIRTUAL VISIT (OUTPATIENT)
Dept: PSYCHOLOGY | Facility: CLINIC | Age: 31
End: 2021-02-01
Payer: COMMERCIAL

## 2021-02-01 DIAGNOSIS — F39 MOOD DISORDER (H): Primary | ICD-10-CM

## 2021-02-01 DIAGNOSIS — F39 MOOD DISORDER (H): ICD-10-CM

## 2021-02-01 DIAGNOSIS — F43.10 PTSD (POST-TRAUMATIC STRESS DISORDER): ICD-10-CM

## 2021-02-01 DIAGNOSIS — F41.1 GAD (GENERALIZED ANXIETY DISORDER): Primary | ICD-10-CM

## 2021-02-01 DIAGNOSIS — F41.9 ANXIETY: ICD-10-CM

## 2021-02-01 PROCEDURE — 99213 OFFICE O/P EST LOW 20 MIN: CPT | Mod: GT | Performed by: PSYCHIATRY & NEUROLOGY

## 2021-02-01 PROCEDURE — 90832 PSYTX W PT 30 MINUTES: CPT | Mod: GT | Performed by: PSYCHOLOGIST

## 2021-02-01 RX ORDER — MIRTAZAPINE 15 MG/1
22.5 TABLET, FILM COATED ORAL AT BEDTIME
Qty: 135 TABLET | Refills: 0 | Status: SHIPPED | OUTPATIENT
Start: 2021-02-01 | End: 2021-04-29 | Stop reason: DRUGHIGH

## 2021-02-01 NOTE — Clinical Note
Patient has had continued improvement.  If she is still stable at next office visit, her care will likely be returned at that time.  I will have her follow-up once more with me.  Let me know if you have any questions or concerns. Thanks!

## 2021-02-01 NOTE — PROGRESS NOTES
"Faustina Lubin is a 30 year old year old who is being evaluated via a billable video visit.      How would you like to obtain your AVS? MyChart  If you are dropped from the video visit, the video invite should be resent to: Text to cell phone: see Epic  Will anyone else be joining your video visit? No       Telemedicine Visit: The patient's condition can be safely assessed and treated via synchronous audio and visual telemedicine encounter.      Reason for Telemedicine Visit: Covid-19 Pandemic    Originating Site (Patient Location): Patient's home     Distant Site (Provider Location): Provider Remote Setting    Mode of Communication:  Video Conference via [employer]    As the provider I attest to compliance with applicable laws and regulations related to telemedicine.        Outpatient Psychiatric Progress Note    Name: Faustina Lubin   : 1990                    Primary Care Provider: Analy Monzon MD   Therapist: [employer] with [facility], now every other week since improving    PHQ-9 scores:  PHQ-9 SCORE 2019 2020 10/2/2020   PHQ-9 Total Score 0 11 18       VICKIE-7 scores:  VICKIE-7 SCORE 2019 2020 10/2/2020   Total Score 2 10 20       Patient Identification:  Patient is a 30 year old, in a relationship   White American female  who presents for return visit with me.  Patient is currently employed full time. Patient attended the phone/video session alone. Patient prefers to be called: \" Jessika\".    Interim History:  I last saw Faustina Lubin for outpatient psychiatry Consultation on 20. During that appointment, we:     Increase mirtazapine/Remeron to 22.5 mg at bedtime for mood, anxiety, sleep    Start hydroxyzine 25-50 mg up to three times a day as needed for anxiety/sleep. Can break a tablet in half and take just 12.5 mg if 25 mg too sedating.     Discontinued sertraline due to side effects    If you would like me to collaborate with your therapist, please fill out/sign " release of information form from [employer] website and upload to your MyChart.    My fax number for any mental health forms that might need to be filled out: [phone]    Continue therapy as planned.    2/1: Patient reports overall doing quite well.  Feels like her depression and anxiety have stabilized.  Work going okay.  She is sleeping much better than she had been previously before starting mirtazapine.  Continues in therapy.  Now going once every other week since she has had some significant symptom improvement.  Denies any negative medication side effects.  Did have some increased hunger/appetite for a few weeks after dose increase of mirtazapine, but that has stabilized as well.  Denies any safety concerns or thoughts of suicide today.  Denies any drug or alcohol use concerns.  She did ask about supplements that can be used and anxiety, particularly one called happy coffee.     Per [facility], Dr. Arsalan Macias, during today's team-based visit:  Jessika reported that her sleep is much better. She feels that her depression/anxiety is a little more stabilized. She said that mirtazapine has helped with her sleep. She noted that for a few weeks she had increased appetite but that has since leveled out. She has not tried the hydroxyzine yet because she has not felt like she has needed it yet. She had a question regarding Happy Coffee and if it would be ok for her to take.    Psychiatric ROS:  Faustina Lubin reports mood has been: Improved  Anxiety has been: Improved  Sleep has been: Improved  Arabella sxs: None  Psychosis sxs: None  ADHD/ADD sxs: None  PTSD sxs: Improved  PHQ9 and GAD7 scores were reviewed today if completed.   Medication side effects: Some increased hunger on mirtazapine, but that is stabilized and no longer a big issue  Current stressors include: See HPI above  Coping mechanisms and supports include: Therapy, Family, Hobbies and Friends    Current medications include:   Current Outpatient Medications    Medication Sig     estradiol (ESTRACE) 0.1 MG/GM vaginal cream      hydrOXYzine (ATARAX) 25 MG tablet Take 1-2 tablets (25-50 mg) by mouth 3 times daily as needed for anxiety (sleep)     mirtazapine (REMERON) 15 MG tablet Take 1.5 tablets (22.5 mg) by mouth At Bedtime     SRONYX 0.1-20 MG-MCG tablet      No current facility-administered medications for this visit.      Past Medical/Surgical History:  Past Medical History:   Diagnosis Date     Abnormal Pap smear of cervix 10/30/2018    See problem list     Contraception 8/12/2013    BCP     Fibrocystic breast changes, right      Headache       has a past medical history of Abnormal Pap smear of cervix (10/30/2018), Contraception (8/12/2013), Fibrocystic breast changes, right, and Headache.    Social History:  Reviewed. No changes to social history except as noted above in HPI.    Vital Signs:   None. This is phone/video visit.     Labs:  Most recent laboratory results reviewed and no new labs.     Review of Systems:  10 systems (general, cardiovascular, respiratory, eyes, ENT, endocrine, GI, , M/S, neurological) were reviewed. Most pertinent finding(s) is/are: denies fever, cough, headaches, shortness of breath, chest pain, N/V, constipation/diarrhea, trouble urinating, aches and pains. The remaining systems are all unremarkable.    Mental Status Examination (limited as this is by phone/video):  Appearance: Awake, alert, good grooming, appears stated age, no acute distress  Attitude:  cooperative, pleasant  Oriented to:  person, place, time, and situation  Attention Span and Concentration:  normal  Speech:  clear, coherent, regular rate, rhythm, and volume  Language: intact  Mood:  better  Affect:  appropriate and in normal range and mood congruent  Associations:  no loose associations  Thought Process:  logical, linear and goal oriented  Thought Content:  no evidence of suicidal ideation or homicidal ideation, no evidence of psychotic thought, no auditory  hallucinations present and no visual hallucinations present  Recent and Remote Memory:  Intact to interview. Not formally assessed. No amnesia.  Fund of Knowledge: appropriate  Insight:  good  Judgment:  intact, adequate for safety  Impulse Control:  intact    Suicide Risk Assessment:  Today Faustina Lubin reports no suicidal ideation. Based on all available evidence including the factors cited above, Faustina Lubin does not appear to be at imminent risk for self-harm, does not meet criteria for a 72-hr hold, and therefore remains appropriate for ongoing outpatient level of care.  A thorough assessment of risk factors related to suicide and self-harm have been reviewed and are noted above. The patient convincingly denies suicidality on several occasions. Local community safety resources printed and reviewed for patient to use if needed. There was no deceit detected, and the patient presented in a manner that was believable.     DSM5 Diagnosis:  309.81 (F43.10) Posttraumatic Stress Disorder (includes Posttraumatic Stress Disorder for Children 6 Years and Younger)  Without dissociative symptoms   Mood disorder, unspecified  Generalized anxiety disorder    Medical comorbidities include:   Patient Active Problem List    Diagnosis Date Noted     Atypical squamous cells of undetermined significance (ASCUS) on Papanicolaou smear of cervix 10/30/2018     Priority: Medium     2012, 2015 NIL paps.  10/30/18 ASCUS pap, Neg  HPV. Plan cotest in 3 years.        Cervicalgia 09/26/2016     Priority: Medium     Mild single current episode of major depressive disorder (H) 09/26/2016     Priority: Medium     Oral contraceptive pill surveillance 09/26/2016     Priority: Medium     Headache 01/22/2014     Priority: Medium     Problem list name updated by automated process. Provider to review       CARDIOVASCULAR SCREENING; LDL GOAL LESS THAN 160 12/22/2011     Priority: Medium     Fibrocystic breast changes 05/10/2011      Priority: Medium       Psychosocial & Contextual Factors: See HPI above    Assessment:  Faustina Lubin reports overall good improvement in her mood, anxiety, PTSD symptoms on mirtazapine.  She had already been having some improvement in her symptoms when I saw her at intake several weeks ago.  She has had further symptom improvement and stabilization with the dose increase at last visit.  Tolerating well.  She continues in therapy every other week.  I recommended she continue working with her therapist for ongoing support.  We discussed some natural supplements that would be safe to take with her current medications that have some evidence to back use.  Discussed lavender, chamomile, and kava.  Discussed happy coffee.  Happy coffee has about 175 mg of caffeine per serving.  Discussed that daily caffeine intake should not exceed 300-400 mg in a healthy individual.  Even this amount can cause worsening anxiety and some people.  There did not seem to be any other ingredients that stood out to be a major concern as far as interactions with her medications go.  She has not had to use hydroxyzine much at all.  Still room to increase mirtazapine if clinically indicated.  If her symptoms remain stable at her next follow-up visit, her care will likely be returned to primary care provider at that time.    Medication side effects and alternatives were reviewed. Health promotion activities recommended and reviewed today. All questions addressed. Education and counseling completed regarding risks and benefits of medications and psychotherapy options. Recommend therapy for additional support.     Treatment Plan:    Continue mirtazapine 22.5 mg at bedtime for mood, anxiety, and sleep    Continue hydroxyzine 25-50 mg up to three times a day as needed for anxiety/sleep. Can break a tablet in half and take just 12.5 mg if 25 mg too sedating.     Continue all other medications as reviewed per electronic medical record today.      Safety plan reviewed. To the Emergency Department as needed or call after hours crisis line at [phone] or [phone]. [place] Crisis Text Line. Text [place] to 254610 or Suicide LifeLine Chat: suicidepreventionlifeline.org/chat    Continue therapy as planned.     Schedule an appointment with me in 3 months or sooner as needed. Call [facility] at [phone] to schedule if someone does not reach out to you within 2-3 days.     Follow up with primary care provider as planned or for acute medical concerns.    Call the psychiatric nurse line with medication questions or concerns at [phone].    "[employer]hart may be used to communicate with your provider, but this is not intended to be used for emergencies.    [place] Resource/webiste with supplement recommendations for anxiety:  https://www.[facility].org/diseases-conditions/generalized-anxiety-disorder/expert-answers/herbal-treatment-for-anxiety/faq-82747933    Administrative Billing:   Phone Call/Video Duration: 14 Minutes  Start: 1:50p  Stop: 2:04p    Time spent with patient was 14 minutes and greater than 50% of time or 8 minutes was spent in counseling and coordination of care regarding above diagnoses and treatment plan.    Patient Status:  Patient will continue to be seen for ongoing consultation and stabilization.    Signed:   Madeleine Ibarra DO  [facility]

## 2021-02-01 NOTE — PROGRESS NOTES
Collaborative Care Psychiatry Service (CCPS)  February 1, 2021      Behavioral Health Clinician Progress Note    Patient Name: Faustina Lubin      Telemedicine Visit: The patient's condition can be safely assessed and treated via synchronous audio and visual telemedicine encounter.      Reason for Telemedicine Visit: Services only offered telehealth    Originating Site (Patient Location): Patient's home    Distant Site (Provider Location): Provider Remote Setting    Consent:  The patient/guardian has verbally consented to: the potential risks and benefits of telemedicine (video visit) versus in person care; bill my insurance or make self-payment for services provided; and responsibility for payment of non-covered services.     Mode of Communication:  Video Conference via [employer]    As the provider I attest to compliance with applicable laws and regulations related to telemedicine.         Service Type:  Individual      Session Start Time: 01:15pm  Session End Time: 01:37pm      Session Length: 16 - 37      Attendees: Patient    Visit Activities (Refresh list every visit): [facility] Only    Diagnostic Assessment Date: 12/09/2020 by JEFFREY Griffin  See Flowsheets for today's PHQ-9 and VICKIE-7 results  Previous PHQ-9:   PHQ-9 SCORE 7/8/2019 9/23/2020 10/2/2020   PHQ-9 Total Score 0 11 18       Previous VICKIE-7:   VICKIE-7 SCORE 7/8/2019 9/23/2020 10/2/2020   Total Score 2 10 20       WHODAS 2.0 Total Score 12/9/2020   Total Score 17        DATA  Extended Session (60+ minutes): No  Interactive Complexity: No  Crisis: No    Medication Compliance:  Yes      Chemical Use Review:   Substance Use: Chemical use reviewed, no active concerns identified      Tobacco Use: No current tobacco use.      Current Stressors / Issues:  Jessika reported that her sleep is much better. She feels that her depression/anxiety is a little more stabilized. She said that mirtazapine has helped with her sleep. She noted that for a few weeks  she had increased appetite but that has since leveled out. She has not tried the hydroxyzine yet because she has not felt like she has needed it yet. She had a question regarding Happy Coffee and if it would be ok for her to take.      Changes in Health Issues:   None reported    Assessment: Current Emotional / Mental Status (status of significant symptoms):  Risk status (Self / Other harm or suicidal ideation)  Patient denies a history of suicidal ideation, suicide attempts, self-injurious behavior, homicidal ideation, homicidal behavior and and other safety concerns  Patient denies current fears or concerns for personal safety.  Patient denies current or recent suicidal ideation or behaviors.  Patient denies current or recent homicidal ideation or behaviors.  Patient denies current or recent self injurious behavior or ideation.  Patient denies other safety concerns.  A safety and risk management plan has not been developed at this time, however patient was encouraged to call [place] [phone] should there be a change in any of these risk factors.    Appearance:   Appropriate   Eye Contact:   Good   Psychomotor Behavior: Normal   Attitude:   Cooperative   Orientation:   All  Speech   Rate / Production: Normal    Volume:  Normal   Mood:    Anxious  Depressed   Affect:    Appropriate   Thought Content:  Clear   Thought Form:  Coherent  Logical   Insight:    Good     Diagnoses:  1. Mood disorder (H)    2. Anxiety        Collateral Reports Completed:  Communicated with: Dr. Ibarra    Plan: (Homework, other):  Patient was given information about behavioral services and encouraged to schedule a follow up appointment with the clinic [facility].  She was also given information about mental health symptoms and treatment options .  CD Recommendations: No indications of CD issues.      Arsalan Macias PsyD, LP      Sarthak Macias PsyD  February 1, 2021

## 2021-02-03 ENCOUNTER — OFFICE VISIT (OUTPATIENT)
Dept: FAMILY MEDICINE | Facility: CLINIC | Age: 31
End: 2021-02-03
Payer: COMMERCIAL

## 2021-02-03 VITALS
WEIGHT: 129.8 LBS | HEIGHT: 64 IN | HEART RATE: 94 BPM | SYSTOLIC BLOOD PRESSURE: 104 MMHG | DIASTOLIC BLOOD PRESSURE: 62 MMHG | BODY MASS INDEX: 22.16 KG/M2 | TEMPERATURE: 98 F | OXYGEN SATURATION: 99 %

## 2021-02-03 DIAGNOSIS — Z00.00 ROUTINE GENERAL MEDICAL EXAMINATION AT A HEALTH CARE FACILITY: Primary | ICD-10-CM

## 2021-02-03 DIAGNOSIS — R87.610 ATYPICAL SQUAMOUS CELLS OF UNDETERMINED SIGNIFICANCE (ASCUS) ON PAPANICOLAOU SMEAR OF CERVIX: ICD-10-CM

## 2021-02-03 DIAGNOSIS — F41.9 ANXIETY: ICD-10-CM

## 2021-02-03 DIAGNOSIS — N89.8 VAGINAL DISCHARGE: ICD-10-CM

## 2021-02-03 LAB
SPECIMEN SOURCE: NORMAL
WET PREP SPEC: NORMAL

## 2021-02-03 PROCEDURE — 87210 SMEAR WET MOUNT SALINE/INK: CPT | Performed by: INTERNAL MEDICINE

## 2021-02-03 PROCEDURE — 99395 PREV VISIT EST AGE 18-39: CPT | Performed by: INTERNAL MEDICINE

## 2021-02-03 ASSESSMENT — MIFFLIN-ST. JEOR: SCORE: 1298.77

## 2021-02-03 NOTE — Clinical Note
Pap smear done 11/2020 at [facility] (see care everywhere)- results were NIL. Due in 3 years.
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
## 2021-02-04 PROBLEM — R87.610 ATYPICAL SQUAMOUS CELLS OF UNDETERMINED SIGNIFICANCE (ASCUS) ON PAPANICOLAOU SMEAR OF CERVIX: Status: ACTIVE | Noted: 2018-10-30

## 2021-02-04 PROBLEM — N76.1 CHRONIC VAGINITIS: Status: ACTIVE | Noted: 2020-08-20

## 2021-02-04 PROBLEM — F41.9 ANXIETY: Status: ACTIVE | Noted: 2021-02-04

## 2021-04-29 ENCOUNTER — VIRTUAL VISIT (OUTPATIENT)
Dept: PSYCHIATRY | Facility: CLINIC | Age: 31
End: 2021-04-29
Payer: COMMERCIAL

## 2021-04-29 ENCOUNTER — VIRTUAL VISIT (OUTPATIENT)
Dept: PSYCHOLOGY | Facility: CLINIC | Age: 31
End: 2021-04-29
Payer: COMMERCIAL

## 2021-04-29 DIAGNOSIS — F41.1 GAD (GENERALIZED ANXIETY DISORDER): ICD-10-CM

## 2021-04-29 DIAGNOSIS — F43.10 PTSD (POST-TRAUMATIC STRESS DISORDER): Primary | ICD-10-CM

## 2021-04-29 DIAGNOSIS — F39 MOOD DISORDER (H): Primary | ICD-10-CM

## 2021-04-29 DIAGNOSIS — F41.9 ANXIETY: ICD-10-CM

## 2021-04-29 PROCEDURE — 99214 OFFICE O/P EST MOD 30 MIN: CPT | Mod: GT | Performed by: PSYCHIATRY & NEUROLOGY

## 2021-04-29 PROCEDURE — 90832 PSYTX W PT 30 MINUTES: CPT | Mod: GT | Performed by: PSYCHOLOGIST

## 2021-04-29 RX ORDER — MIRTAZAPINE 15 MG/1
15 TABLET, FILM COATED ORAL AT BEDTIME
COMMUNITY
End: 2021-07-15 | Stop reason: ALTCHOICE

## 2021-04-29 NOTE — PATIENT INSTRUCTIONS
Treatment Plan:    Decrease mirtazapine to 15 mg at bedtime for mood, anxiety, and sleep    Make sure to track what you eat to ensure healthy caloric intake and also work to increase activity outside of work.    Continue hydroxyzine 25-50 mg up to three times a day as needed for anxiety/sleep. Can break a tablet in half and take just 12.5 mg if 25 mg too sedating.     Continue all other medications as reviewed per electronic medical record today.     Safety plan reviewed. To the Emergency Department as needed or call after hours crisis line at [phone] or [phone]. [place] Crisis Text Line. Text [place] to 232027 or Suicide LifeLine Chat: suicidepreventionSynerGene Therapeuticsline.org/chat    Continue therapy as planned.     Schedule an appointment with me in 4 weeks or sooner as needed. Call [facility] at [phone] to schedule if someone does not reach out to you within 2-3 days.     Follow up with primary care provider as planned or for acute medical concerns.    Call the psychiatric nurse line with medication questions or concerns at [phone].    eTobbt may be used to communicate with your provider, but this is not intended to be used for emergencies.

## 2021-04-29 NOTE — PROGRESS NOTES
"Collaborative Care Psychiatry Service (CCPS)  April 29, 2021      Behavioral Health Clinician Progress Note    Patient Name: Faustina Lubin      Telemedicine Visit: The patient's condition can be safely assessed and treated via synchronous audio and visual telemedicine encounter.      Reason for Telemedicine Visit: Services only offered telehealth    Originating Site (Patient Location): Patient's home    Distant Site (Provider Location): Provider Remote Setting    Consent:  The patient/guardian has verbally consented to: the potential risks and benefits of telemedicine (video visit) versus in person care; bill my insurance or make self-payment for services provided; and responsibility for payment of non-covered services.     Mode of Communication:  Video Conference via MergeLocal    As the provider I attest to compliance with applicable laws and regulations related to telemedicine.         Service Type:  Individual      Session Start Time: 10:15am  Session End Time: 10:35am      Session Length: 16 - 37      Attendees: Patient    Visit Activities (Refresh list every visit): [facility] Only    Diagnostic Assessment Date: 12/09/2020 by JEFFREY Griffin  See Flowsheets for today's PHQ-9 and VICKIE-7 results  Previous PHQ-9:   PHQ-9 SCORE 7/8/2019 9/23/2020 10/2/2020   PHQ-9 Total Score 0 11 18       Previous VICKIE-7:   VICKIE-7 SCORE 7/8/2019 9/23/2020 10/2/2020   Total Score 2 10 20       WHODAS 2.0 Total Score 12/9/2020   Total Score 17        DATA  Extended Session (60+ minutes): No  Interactive Complexity: No  Crisis: No    Medication Compliance:  Yes      Chemical Use Review:   Substance Use: Chemical use reviewed, no active concerns identified      Tobacco Use: No current tobacco use.      Current Stressors / Issues:  \"I feel like things have been stable the last couple of months.\" She is generally pleased with her progress. She noted that the Happy Coffee did not seem to make any difference for her. She believes " she continues to gain some weight. She believes she has gained 12-13 pounds in total. She would like to discuss with Dr. Ibarra if the weight will stabilize or if she will continue to gain over time with the medications. She also questioned if she will need to be on medications for the rest of her life as her desire is to not be on any medications. She was encouraged to continue working in therapy to learn behavioral management strategies as this would afford her a better chance at being able to eventually reduce or eliminate her need for medications with proper guidance from her medical providers.      Changes in Health Issues:   None reported    Assessment: Current Emotional / Mental Status (status of significant symptoms):  Risk status (Self / Other harm or suicidal ideation)  Patient denies a history of suicidal ideation, suicide attempts, self-injurious behavior, homicidal ideation, homicidal behavior and and other safety concerns  Patient denies current fears or concerns for personal safety.  Patient denies current or recent suicidal ideation or behaviors.  Patient denies current or recent homicidal ideation or behaviors.  Patient denies current or recent self injurious behavior or ideation.  Patient denies other safety concerns.  A safety and risk management plan has not been developed at this time, however patient was encouraged to call [facility] should there be a change in any of these risk factors.    Appearance:   Appropriate   Eye Contact:   Good   Psychomotor Behavior: Normal   Attitude:   Cooperative   Orientation:   All  Speech   Rate / Production: Normal    Volume:  Normal   Mood:    Anxious  Depressed   Affect:    Appropriate   Thought Content:  Clear   Thought Form:  Coherent  Logical   Insight:    Good     Diagnoses:  1. Mood disorder (H)    2. Anxiety        Collateral Reports Completed:  Communicated with: Dr. Ibarra    Plan: (Homework, other):  Patient was given information about  behavioral services and encouraged to schedule a follow up appointment with the clinic [facility] in conjunction with next CCPS appointment.  She was also given information about mental health symptoms and treatment options .  CD Recommendations: No indications of CD issues.      Arsalan Macias PsyD, LP      Sarthak Macias PsyD  April 29, 2021

## 2021-04-29 NOTE — Clinical Note
Please call this patient to get them scheduled for a follow-up visit in 4 weeks. Please schedule with me and the [facility]. Thanks!

## 2021-04-29 NOTE — PROGRESS NOTES
"Faustina Lubin is a 30 year old year old who is being evaluated via a billable video visit.      How would you like to obtain your AVS? MyChart  If you are dropped from the video visit, the video invite should be resent to: Text to cell phone: see Epic  Will anyone else be joining your video visit? No       Telemedicine Visit: The patient's condition can be safely assessed and treated via synchronous audio and visual telemedicine encounter.      Reason for Telemedicine Visit: Covid-19 Pandemic    Originating Site (Patient Location): Patient's home     Distant Site (Provider Location): Provider Remote Setting    Mode of Communication:  Video Conference via Nimb[employer]    As the provider I attest to compliance with applicable laws and regulations related to telemedicine.        Outpatient Psychiatric Progress Note    Name: Faustina Lubin   : 1990                    Primary Care Provider: Analy Monzon MD   Therapist: [employer] [facility] with [facility], now every other week since improving    PHQ-9 scores:  PHQ-9 SCORE 2019 2020 10/2/2020   PHQ-9 Total Score 0 11 18       VICKIE-7 scores:  VICKIE-7 SCORE 2019 2020 10/2/2020   Total Score 2 10 20       Patient Identification:  Patient is a 30 year old, in a relationship   White American female  who presents for return visit with me.  Patient is currently employed full time. Patient attended the phone/video session alone. Patient prefers to be called: \" Jessika\".    Interim History:  I last saw Faustina Lubin for outpatient psychiatry Return Visit on 21. During that appointment, we:     Continue mirtazapine 22.5 mg at bedtime for mood, anxiety, and sleep    Continue hydroxyzine 25-50 mg up to three times a day as needed for anxiety/sleep. Can break a tablet in half and take just 12.5 mg if 25 mg too sedating.     : Pt reports overall still doing pretty good and feeling pretty stable on mirtazapine.  She has not had any issues with sleep.  " "Anxiety has been very manageable.  Unfortunately she has been experiencing some weight gain.  Thinks she has gained 12-13 pounds since starting mirtazapine.  She is unsure if the weight gain is slowing down or whether it was gained over time vs mostly initially after starting the medication.  She has continued to be engaged regularly in individual therapy.  Finds it very helpful. Has not been taking hydroxyzine at all. Energy ok but could be a little better. Sometimes sleeping a lot, up to 12 hours, on days off. Working a lot. Is walking almost all shift at work. No exercise outside of work. No safety concerns.  No SI.  No problematic drug or alcohol use.    Per [facility], Dr. Arsalan Macias, during today's team-based visit:  \"I feel like things have been stable the last couple of months.\" She is generally pleased with her progress. She noted that the Happy Coffee did not seem to make any difference for her. She believes she continues to gain some weight. She believes she has gained 12-13 pounds in total. She would like to discuss with Dr. Ibarra if the weight will stabilize or if she will continue to gain over time with the medications. She also questioned if she will need to be on medications for the rest of her life as her desire is to not be on any medications. She was encouraged to continue working in therapy to learn behavioral management strategies as this would afford her a better chance at being able to eventually reduce or eliminate her need for medications with proper guidance from her medical providers.    Psychiatric ROS:  Faustina Lubin reports mood has been: Stable  Anxiety has been: Stable  Sleep has been: Stable  Arabella sxs: None  Psychosis sxs: None  ADHD/ADD sxs: None  PTSD sxs: Improved/stable  PHQ9 and GAD7 scores were reviewed today if completed.   Medication side effects: Weight gain  Current stressors include: See HPI above  Coping mechanisms and supports include: Therapy, Family, Hobbies and " Friends    Current medications include:   Current Outpatient Medications   Medication Sig     estradiol (ESTRACE) 0.1 MG/GM vaginal cream      hydrOXYzine (ATARAX) 25 MG tablet Take 1-2 tablets (25-50 mg) by mouth 3 times daily as needed for anxiety (sleep)     mirtazapine (REMERON) 15 MG tablet Take 1.5 tablets (22.5 mg) by mouth At Bedtime     SRONYX 0.1-20 MG-MCG tablet      No current facility-administered medications for this visit.      Past Medical/Surgical History:  Past Medical History:   Diagnosis Date     Abnormal Pap smear of cervix 10/30/2018    See problem list     Contraception 8/12/2013    BCP     Fibrocystic breast changes, right      Headache       has a past medical history of Abnormal Pap smear of cervix (10/30/2018), Contraception (8/12/2013), Fibrocystic breast changes, right, and Headache.    Social History:  Reviewed. No changes to social history except as noted above in HPI.    Vital Signs:   None. This is phone/video visit.     Labs:  Most recent laboratory results reviewed and no new labs.     Review of Systems:  10 systems (general, cardiovascular, respiratory, eyes, ENT, endocrine, GI, , M/S, neurological) were reviewed. Most pertinent finding(s) is/are: denies fever, cough, headaches, shortness of breath, chest pain, N/V, constipation/diarrhea, trouble urinating, aches and pains. The remaining systems are all unremarkable.    Mental Status Examination (limited as this is by phone/video):  Appearance: Awake, alert, good grooming, appears stated age, no acute distress  Attitude:  cooperative, pleasant  Oriented to:  person, place, time, and situation  Attention Span and Concentration:  normal  Speech:  clear, coherent, regular rate, rhythm, and volume  Language: intact  Mood:  better, good  Affect:  appropriate and in normal range and mood congruent  Associations:  no loose associations  Thought Process:  logical, linear and goal oriented  Thought Content:  no evidence of suicidal  ideation or homicidal ideation, no evidence of psychotic thought, no auditory hallucinations present and no visual hallucinations present  Recent and Remote Memory:  Intact to interview. Not formally assessed. No amnesia.  Fund of Knowledge: appropriate  Insight:  good  Judgment:  intact, adequate for safety  Impulse Control:  intact    Suicide Risk Assessment:  Today Faustina Lubin reports no suicidal ideation. Based on all available evidence including the factors cited above, Faustina Lubin does not appear to be at imminent risk for self-harm, does not meet criteria for a 72-hr hold, and therefore remains appropriate for ongoing outpatient level of care.  A thorough assessment of risk factors related to suicide and self-harm have been reviewed and are noted above. The patient convincingly denies suicidality on several occasions. Local community safety resources printed and reviewed for patient to use if needed. There was no deceit detected, and the patient presented in a manner that was believable.     DSM5 Diagnosis:  309.81 (F43.10) Posttraumatic Stress Disorder (includes Posttraumatic Stress Disorder for Children 6 Years and Younger)  Without dissociative symptoms   Generalized anxiety disorder    Medical comorbidities include:   Patient Active Problem List    Diagnosis Date Noted     Anxiety 02/04/2021     Priority: Medium     Chronic vaginitis 08/20/2020     Priority: Medium     Atypical squamous cells of undetermined significance (ASCUS) on Papanicolaou smear of cervix 10/30/2018     Priority: Medium     2012, 2015 NIL paps.  10/30/18 ASCUS pap, Neg  HPV. Plan cotest in 3 years.   11/2020 NIL at [facility]        Cervicalgia 09/26/2016     Priority: Medium     Mild single current episode of major depressive disorder (H) 09/26/2016     Priority: Medium     Oral contraceptive pill surveillance 09/26/2016     Priority: Medium     Headache 01/22/2014     Priority: Medium     Problem list name updated by  automated process. Provider to review       Fibrocystic breast changes 05/10/2011     Priority: Medium       Psychosocial & Contextual Factors: See HPI above    Assessment:  Faustina Lubin reports overall ongoing stability regarding mood, anxiety, and PTSD symptoms.  Sleeping well.  Mirtazapine has been helpful for her symptoms.  She is also been engaged in therapy on a regular basis.  Therapy has also been very helpful.  Unfortunately having weight gain from mirtazapine.  This is common side effect.  Think she has gained 12-13 pounds on mirtazapine.  Currently about 140 pounds.  Office visit in early February she was 129 pounds.  It seems she has gained most of her weight since increasing the dose to 22.5 mg at bedtime.  We will decrease her dose back to 15 mg in hopes of decreasing chances of further weight gain.  Unsure if she will lose gained weight while on mirtazapine.  Encouraged increased activity.  If she continues to gain weight may need to consider other options.  She has been utilizing coping skills and strategies learned in therapy.  She hopes to someday be off all medications.  If no symptom decompensation with dose decrease and if still gaining weight, could always consider trial with further decreased dose to 7.5 mg and continue to monitor symptoms.  She was encouraged to continue in individual therapy.    Medication side effects and alternatives were reviewed. Health promotion activities recommended and reviewed today. All questions addressed. Education and counseling completed regarding risks and benefits of medications and psychotherapy options. Recommend therapy for additional support.     Treatment Plan:    Decrease mirtazapine to 15 mg at bedtime for mood, anxiety, and sleep    Make sure to track what you eat to ensure healthy caloric intake and also work to increase activity outside of work.    Continue hydroxyzine 25-50 mg up to three times a day as needed for anxiety/sleep. Can break a  tablet in half and take just 12.5 mg if 25 mg too sedating.     Continue all other medications as reviewed per electronic medical record today.     Safety plan reviewed. To the Emergency Department as needed or call after hours crisis line at [phone] or [phone]. [place] Crisis Text Line. Text [place] to 688788 or Suicide LifeLine Chat: suicidepreventionElysialine.org/chat    Continue therapy as planned.     Schedule an appointment with me in 4 weeks or sooner as needed. Call [facility] at [phone] to schedule if someone does not reach out to you within 2-3 days.     Follow up with primary care provider as planned or for acute medical concerns.    Call the psychiatric nurse line with medication questions or concerns at [phone].    DigePrintt may be used to communicate with your provider, but this is not intended to be used for emergencies.    Administrative Billing:   Phone Call/Video Duration: 11 Minutes  Start: 10:56a  Stop: 11:07a    Time spent with patient was 11 minutes and greater than 50% of time or 6 minutes was spent in counseling and coordination of care regarding above diagnoses and treatment plan. Patient with multiple psychiatric diagnoses and medication change adding to complexity of care.      Patient Status:  Patient will continue to be seen for ongoing consultation and stabilization.    Signed:   Madeleine Ibarra DO  [facility]

## 2021-06-01 ENCOUNTER — VIRTUAL VISIT (OUTPATIENT)
Dept: PSYCHIATRY | Facility: CLINIC | Age: 31
End: 2021-06-01
Payer: COMMERCIAL

## 2021-06-01 ENCOUNTER — VIRTUAL VISIT (OUTPATIENT)
Dept: BEHAVIORAL HEALTH | Facility: CLINIC | Age: 31
End: 2021-06-01
Payer: COMMERCIAL

## 2021-06-01 DIAGNOSIS — F43.10 PTSD (POST-TRAUMATIC STRESS DISORDER): ICD-10-CM

## 2021-06-01 DIAGNOSIS — F41.1 GAD (GENERALIZED ANXIETY DISORDER): ICD-10-CM

## 2021-06-01 DIAGNOSIS — F39 MOOD DISORDER (H): ICD-10-CM

## 2021-06-01 DIAGNOSIS — F33.0 MAJOR DEPRESSIVE DISORDER, RECURRENT EPISODE, MILD (H): Primary | ICD-10-CM

## 2021-06-01 DIAGNOSIS — F41.1 GAD (GENERALIZED ANXIETY DISORDER): Primary | ICD-10-CM

## 2021-06-01 PROCEDURE — 99213 OFFICE O/P EST LOW 20 MIN: CPT | Mod: GT | Performed by: PSYCHIATRY & NEUROLOGY

## 2021-06-01 PROCEDURE — 90832 PSYTX W PT 30 MINUTES: CPT | Mod: GT | Performed by: MARRIAGE & FAMILY THERAPIST

## 2021-06-01 ASSESSMENT — ANXIETY QUESTIONNAIRES
2. NOT BEING ABLE TO STOP OR CONTROL WORRYING: SEVERAL DAYS
GAD7 TOTAL SCORE: 4
4. TROUBLE RELAXING: NOT AT ALL
7. FEELING AFRAID AS IF SOMETHING AWFUL MIGHT HAPPEN: NOT AT ALL
1. FEELING NERVOUS, ANXIOUS, OR ON EDGE: SEVERAL DAYS
7. FEELING AFRAID AS IF SOMETHING AWFUL MIGHT HAPPEN: NOT AT ALL
5. BEING SO RESTLESS THAT IT IS HARD TO SIT STILL: NOT AT ALL
6. BECOMING EASILY ANNOYED OR IRRITABLE: SEVERAL DAYS
GAD7 TOTAL SCORE: 4
GAD7 TOTAL SCORE: 4
3. WORRYING TOO MUCH ABOUT DIFFERENT THINGS: SEVERAL DAYS

## 2021-06-01 ASSESSMENT — PATIENT HEALTH QUESTIONNAIRE - PHQ9
SUM OF ALL RESPONSES TO PHQ QUESTIONS 1-9: 7
10. IF YOU CHECKED OFF ANY PROBLEMS, HOW DIFFICULT HAVE THESE PROBLEMS MADE IT FOR YOU TO DO YOUR WORK, TAKE CARE OF THINGS AT HOME, OR GET ALONG WITH OTHER PEOPLE: SOMEWHAT DIFFICULT
SUM OF ALL RESPONSES TO PHQ QUESTIONS 1-9: 7

## 2021-06-01 NOTE — PROGRESS NOTES
"Faustina Lubin is a 30 year old year old who is being evaluated via a billable video visit.      How would you like to obtain your AVS? MyChart  If you are dropped from the video visit, the video invite should be resent to: Text to cell phone: see Epic  Will anyone else be joining your video visit? No       Telemedicine Visit: The patient's condition can be safely assessed and treated via synchronous audio and visual telemedicine encounter.      Reason for Telemedicine Visit: Covid-19 Pandemic    Originating Site (Patient Location): Patient's home     Distant Site (Provider Location): Provider Remote Setting    Mode of Communication:  Video Conference via TheInfoPro    As the provider I attest to compliance with applicable laws and regulations related to telemedicine.        Outpatient Psychiatric Progress Note    Name: Faustina Lubin   : 1990                    Primary Care Provider: Analy Monzon MD   Therapist: Nimco Cleaning with [facility], now every other week since improving    PHQ-9 scores:  PHQ-9 SCORE 2020 10/2/2020 2021   PHQ-9 Total Score MyChart - - 7 (Mild depression)   PHQ-9 Total Score 11 18 7       VICKIE-7 scores:  VICKIE-7 SCORE 2020 10/2/2020 2021   Total Score - - 4 (minimal anxiety)   Total Score 10 20 4       Patient Identification:  Patient is a 30 year old, in a relationship   White American female  who presents for return visit with me.  Patient is currently employed full time. Patient attended the phone/video session alone. Patient prefers to be called: \" Jessika\".    Interim History:  I last saw Faustina Lubin for outpatient psychiatry Return Visit on 21. During that appointment, we:     Decrease mirtazapine to 15 mg at bedtime for mood, anxiety, and sleep    Make sure to track what you eat to ensure healthy caloric intake and also work to increase activity outside of work.    Continue hydroxyzine 25-50 mg up to three times a day as needed for " anxiety/sleep. Can break a tablet in half and take just 12.5 mg if 25 mg too sedating.     6/1: Days off she is sleeping about 12-14 hours. A few days left of night shift and then is switching to a different shift that she hopes will be more sustainable and better for her mental health.  She often feels drowsy and has poor energy.  Feels tired all the time.  Not sure that anything has changed much since last visit.  No safety concerns.  No SI.  Continues in individual psychotherapy.  No problematic drug or alcohol use.    Per [facility], Darya Munoz, LMFT, during today's team-based visit:  Questions/Thoughts for Dr. Ibarra:  Highlights: Still tired all the time. Not sure if anything is better  Current Symptoms: drowsy  Worse/Better/Same: slightly better  Side Effects: tired  Mood: Anxiety and depression  Suicidality: Reviewed safety with Patient. Patient denies any current SI,plans or intentions.   Substance Use: Once in a while  Therapist: Nimco Aponte. It is going fine. Biweekly. Does not want to change  Medication Questions/Requests: Wants to know if medication is making her tired all the time.   Sleeping 12-14 hours and struggling to stay up. Has three days off and sleeps. Eating is less with medication decrease.   Shift Work Sleep: Sleep Smarter for Night [employer] recommended book. 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 Will talk about sleep more after sift change. Had a incident with co-worker. Talked how work has karrie hard on her the last year. Talked about seeing how sleep and depression are effected by new schedule. Will talk about sleep more after that.     Past Med Trials:  Sertraline-extreme activation, severe GI side effects, severe insomnia, severe anxiety    Psychiatric ROS:  Faustina Lubin reports mood has been: A little more down due to poor energy and fatigue  Anxiety has been: Stable, manageable  Sleep has been: Excessive, see HPI above  Arabella sxs: None  Psychosis sxs: None  ADHD/ADD sxs:  None  PTSD sxs: Improved/stable  PHQ9 and GAD7 scores were reviewed today if completed.   Medication side effects: Wonders if some of her fatigue and sedation might be from medication  Current stressors include: See HPI above  Coping mechanisms and supports include: Therapy, Family, Hobbies and Friends    Current medications include:   Current Outpatient Medications   Medication Sig     estradiol (ESTRACE) 0.1 MG/GM vaginal cream      hydrOXYzine (ATARAX) 25 MG tablet Take 1-2 tablets (25-50 mg) by mouth 3 times daily as needed for anxiety (sleep)     mirtazapine (REMERON) 15 MG tablet Take 15 mg by mouth At Bedtime     SRONYX 0.1-20 MG-MCG tablet      No current facility-administered medications for this visit.      Past Medical/Surgical History:  Past Medical History:   Diagnosis Date     Abnormal Pap smear of cervix 10/30/2018    See problem list     Contraception 8/12/2013    BCP     Fibrocystic breast changes, right      Headache       has a past medical history of Abnormal Pap smear of cervix (10/30/2018), Contraception (8/12/2013), Fibrocystic breast changes, right, and Headache.    Social History:  Reviewed. No changes to social history except as noted above in HPI.    Vital Signs:   None. This is phone/video visit.     Labs:  Most recent laboratory results reviewed and no new pertinent labs.     Review of Systems:  10 systems (general, cardiovascular, respiratory, eyes, ENT, endocrine, GI, , M/S, neurological) were reviewed. Most pertinent finding(s) is/are: denies fever, cough, headaches, shortness of breath, chest pain, N/V, constipation/diarrhea, trouble urinating, aches and pains. The remaining systems are all unremarkable.    Mental Status Examination (limited as this is by phone/video):  Appearance: Awake, alert, good grooming, appears stated age, no acute distress  Attitude:  cooperative, pleasant  Oriented to:  person, place, time, and situation  Attention Span and Concentration:  normal  Speech:   clear, coherent, regular rate, rhythm, and volume  Language: intact  Mood: Tired  Affect:  appropriate and in normal range and mood congruent  Associations:  no loose associations  Thought Process:  logical, linear and goal oriented  Thought Content:  no evidence of suicidal ideation or homicidal ideation, no evidence of psychotic thought, no auditory hallucinations present and no visual hallucinations present  Recent and Remote Memory:  Intact to interview. Not formally assessed. No amnesia.  Fund of Knowledge: appropriate  Insight:  good  Judgment:  intact, adequate for safety  Impulse Control:  intact    Suicide Risk Assessment:  Today Faustina Lubin reports no suicidal ideation. Based on all available evidence including the factors cited above, Faustina Lubin does not appear to be at imminent risk for self-harm, does not meet criteria for a 72-hr hold, and therefore remains appropriate for ongoing outpatient level of care.  A thorough assessment of risk factors related to suicide and self-harm have been reviewed and are noted above. The patient convincingly denies suicidality on several occasions. Local community safety resources printed and reviewed for patient to use if needed. There was no deceit detected, and the patient presented in a manner that was believable.     DSM5 Diagnosis:  309.81 (F43.10) Posttraumatic Stress Disorder (includes Posttraumatic Stress Disorder for Children 6 Years and Younger)  Without dissociative symptoms   Generalized anxiety disorder  Mood disorder, unspecified (rule out major depressive disorder)    Medical comorbidities include:   Patient Active Problem List    Diagnosis Date Noted     Major depressive disorder, recurrent episode, mild (H) 06/01/2021     Priority: Medium     VICKIE (generalized anxiety disorder) 06/01/2021     Priority: Medium     Anxiety 02/04/2021     Priority: Medium     Chronic vaginitis 08/20/2020     Priority: Medium     Atypical squamous cells of  undetermined significance (ASCUS) on Papanicolaou smear of cervix 10/30/2018     Priority: Medium     2012, 2015 NIL paps.  10/30/18 ASCUS pap, Neg  HPV. Plan cotest in 3 years.   11/2020 NIL at [facility]        Cervicalgia 09/26/2016     Priority: Medium     Mild single current episode of major depressive disorder (H) 09/26/2016     Priority: Medium     Oral contraceptive pill surveillance 09/26/2016     Priority: Medium     Headache 01/22/2014     Priority: Medium     Problem list name updated by automated process. Provider to review       Fibrocystic breast changes 05/10/2011     Priority: Medium       Psychosocial & Contextual Factors: See HPI above    Assessment:  Faustina Lubin reports feeling a little more down lately due to poor energy and fatigue.  Sleeping more than she would like.  Still feels exhausted when she wakes up.  Mirtazapine is still helpful for her symptoms.  Discussed possibly trying a different medication or changing the dose of mirtazapine.  Since she is switching to a more sustainable shift for work, we will wait on making any changes to see how her sleep schedule stabilizes and whether her symptoms improve.  Plan B would be to stop mirtazapine and see if her symptoms improve without any decompensation of trauma or anxiety symptoms.  Plan C would include starting something new, potentially Escitalopram/Lexapro 2.5 mg daily.  Would start low and increase slowly due to her pretty extreme reaction to sertraline in the past.  She will follow-up with me in about 6 weeks. She was encouraged to continue in individual therapy.    Medication side effects and alternatives were reviewed. Health promotion activities recommended and reviewed today. All questions addressed. Education and counseling completed regarding risks and benefits of medications and psychotherapy options. Recommend therapy for additional support.     Treatment Plan:    Continue mirtazapine 15 mg at bedtime for mood, anxiety, and  "sleep and see if sleep schedule stabilizes soon with change in work schedule.     If symptoms do not improve with changed work schedule, stop mirtazapine and see how you feel. If symptoms worsen, please reach out so we can start a new medication-likely Lexapro at low dose of 2.5 mg daily for two weeks then 5 mg daily for two weeks, etc.     Continue hydroxyzine 25-50 mg up to three times a day as needed for anxiety/sleep. Can break a tablet in half and take just 12.5 mg if 25 mg too sedating.     Continue all other medications as reviewed per electronic medical record today.     Safety plan reviewed. To the Emergency Department as needed or call after hours crisis line at [phone] or [phone]. [place] Crisis Text Line. Text [place] to 989090 or Suicide LifeLine Chat: suicidepreventionSpace [place].org/chat    Continue therapy as planned.     Schedule an appointment with me in 4-6 weeks or sooner as needed. Call [facility] at [phone] to schedule if someone does not reach out to you within 2-3 days.     Follow up with primary care provider as planned or for acute medical concerns.    Call the psychiatric nurse line with medication questions or concerns at [phone].    [place] may be used to communicate with your provider, but this is not intended to be used for emergencies.    Administrative Billing:   Phone Call/Video Duration: 6 Minutes    Time spent with patient was 6 minutes and greater than 50% of time or 3.5 minutes was spent in counseling and coordination of care regarding above diagnoses and treatment plan. Patient with multiple psychiatric diagnoses and medication change adding to complexity of care.      Patient Status:  Patient will continue to be seen for ongoing consultation and stabilization.    Signed:   Madeleine Ibarra DO  [facility]    This note was created with voice recognition software. Inadvertent grammatical errors, typographical errors, and \"sound-a-like\" " substitutions may occur due to limitations of the software.  Read the note carefully and apply context when erroneous substitutions have occurred. Thank you.

## 2021-06-01 NOTE — Clinical Note
Please call this patient to get them scheduled for a follow-up visit in 4-6 weeks. Please schedule with me and the [facility]. Thanks!

## 2021-06-01 NOTE — PROGRESS NOTES
Collaborative Care Psychiatry Service (CCPS)  June 1, 2021    Behavioral Health Clinician Progress Note    Patient Name: Faustina Lubin      Telemedicine Visit: The patient's condition can be safely assessed and treated via synchronous audio and visual telemedicine encounter.      Reason for Telemedicine Visit: Services only offered telehealth    Originating Site (Patient Location): Patient's home    Distant Site (Provider Location): [facility]: [place]    Consent:  The patient/guardian has verbally consented to: the potential risks and benefits of telemedicine (video visit) versus in person care; bill my insurance or make self-payment for services provided; and responsibility for payment of non-covered services.     Mode of Communication:  Video Conference via G-mode    As the provider I attest to compliance with applicable laws and regulations related to telemedicine.         Service Type:  Individual      Service Location:   Face to Face in Home / Community     Session Start Time: 1:17 pm Session End Time: 1:37 pm      Session Length: 16 - 37      Attendees: Client    Visit Activities (Refresh list every visit): [facility] Only    Diagnostic Assessment Date: 12/9/20 Marsha Newton LICSW  See Flowsheets for today's PHQ-9 and VICKIE-7 results  Previous PHQ-9:   PHQ-9 SCORE 7/8/2019 9/23/2020 10/2/2020   PHQ-9 Total Score 0 11 18       Previous VICKIE-7:   VICKIE-7 SCORE 7/8/2019 9/23/2020 10/2/2020   Total Score 2 10 20       WHODAS  WHODAS 2.0 Total Score 12/9/2020   Total Score 17        AUDIT  No flowsheet data found.    DATA  Extended Session (60+ minutes): No  Interactive Complexity: No  Crisis: No    Medication Compliance:  Yes      Chemical Use Review:   Substance Use: Chemical use reviewed, no active concerns identified      Tobacco Use: No current tobacco use.      Current Stressors / Issues:  Questions/Thoughts for Dr. Ibarra:  Highlights: Still tired all the time. Not sure if anything is  better  Current Symptoms: drowsy  Worse/Better/Same: slightly better  Side Effects: tired  Mood: Anxiety and depression  Suicidality: Reviewed safety with Patient. Patient denies any current SI,plans or intentions.   Substance Use: Once in a while  Therapist: Nimco Aponte. It is going fine. Biweekly. Does not want to change  Medication Questions/Requests: Wants to know if medication is making her tired all the time.     Sleeping 12-14 hours and struggling to stay up. Has three days off and sleeps. Eating is less with medication decrease.   Shift Work Sleep: Sleep Smarter for Night Shift [employer] recommended book. 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 Will talk about sleep more after sift change. Had a incident with co-worker. Talked how work has karrie hard on her the last year. Talked about seeing how sleep and depression are effected by new schedule. Will talk about sleep more after that.     Review of Symptoms per patient report:  Depression: Change in sleep, Lack of interest, Excessive or inappropriate guilt, Change in energy level and Feeling sad, down, or depressed  Arabella:  No Symptoms  Psychosis: No Symptoms  Anxiety: Excessive worry, Nervousness, Sleep disturbance, Ruminations, Poor concentration and Irritability  Panic:  No symptoms  Post Traumatic Stress Disorder:  No Symptoms   Eating Disorder: No Symptoms  ADD / ADHD:  No symptoms  Conduct Disorder: No symptoms  Autism Spectrum Disorder: No symptoms  Obsessive Compulsive Disorder: Obsessions      Changes in Health Issues:   None reported    Assessment: Current Emotional / Mental Status (status of significant symptoms):  Risk status (Self / Other harm or suicidal ideation)  Patient denies a history of suicidal ideation, suicide attempts, self-injurious behavior, homicidal ideation, homicidal behavior and and other safety concerns  Patient denies current fears or concerns for personal safety.  Patient denies current or recent suicidal ideation or  behaviors.  Patient denies current or recent homicidal ideation or behaviors.  Patient denies current or recent self injurious behavior or ideation.  Patient denies other safety concerns.  A safety and risk management plan has not been developed at this time, however patient was encouraged to call [place] [phone] should there be a change in any of these risk factors.    Appearance:   Appropriate   Eye Contact:   Good   Psychomotor Behavior: Normal   Attitude:   Cooperative   Orientation:   All  Speech   Rate / Production: Slow    Volume:  Soft   Mood:    Normal  Affect:    Flat   Thought Content:  Clear   Thought Form:  Coherent  Logical   Insight:    Fair     Diagnoses:  1. Major depressive disorder, recurrent episode, mild (H)    2. VICKIE (generalized anxiety disorder)        Collateral Reports Completed:  Communicated with: Dr. Ibarra    Plan: (Homework, other):  Patient was given information about behavioral services and encouraged to schedule a follow up appointment with the clinic [facility]  She was also given information about mental health symptoms and treatment options .  CD Recommendations: No indications of CD issues.  NELI Guadarrama, [facility]      NELI Bowman  June 1, 2021

## 2021-06-02 ASSESSMENT — PATIENT HEALTH QUESTIONNAIRE - PHQ9: SUM OF ALL RESPONSES TO PHQ QUESTIONS 1-9: 7

## 2021-06-02 ASSESSMENT — ANXIETY QUESTIONNAIRES: GAD7 TOTAL SCORE: 4

## 2021-06-03 NOTE — PATIENT INSTRUCTIONS
Treatment Plan updated:    Continue mirtazapine 15 mg at bedtime for mood, anxiety, and sleep and see if sleep schedule stabilizes soon with change in work schedule.     If symptoms do not improve with changed work schedule, stop mirtazapine and see how you feel. If symptoms worsen, please reach out so we can start a new medication-likely Lexapro at low dose of 2.5 mg daily for two weeks then 5 mg daily for two weeks, etc.     Continue hydroxyzine 25-50 mg up to three times a day as needed for anxiety/sleep. Can break a tablet in half and take just 12.5 mg if 25 mg too sedating.     Continue all other medications as reviewed per electronic medical record today.     Safety plan reviewed. To the Emergency Department as needed or call after hours crisis line at [phone] or [phone]. [place] Crisis Text Line. Text [place] to 321720 or Suicide LifeLine Chat: [email or web address].org/chat    Continue therapy as planned.     Schedule an appointment with me in 4-6 weeks or sooner as needed. Call [facility] at [phone] to schedule if someone does not reach out to you within 2-3 days.     Follow up with primary care provider as planned or for acute medical concerns.    Call the psychiatric nurse line with medication questions or concerns at [phone].    [place] may be used to communicate with your provider, but this is not intended to be used for emergencies.

## 2021-06-09 ENCOUNTER — MYC MEDICAL ADVICE (OUTPATIENT)
Dept: BEHAVIORAL HEALTH | Facility: CLINIC | Age: 31
End: 2021-06-09

## 2021-06-23 ENCOUNTER — OFFICE VISIT (OUTPATIENT)
Dept: FAMILY MEDICINE | Facility: CLINIC | Age: 31
End: 2021-06-23
Payer: COMMERCIAL

## 2021-06-23 VITALS
BODY MASS INDEX: 23.52 KG/M2 | DIASTOLIC BLOOD PRESSURE: 70 MMHG | OXYGEN SATURATION: 94 % | TEMPERATURE: 99 F | HEART RATE: 96 BPM | SYSTOLIC BLOOD PRESSURE: 106 MMHG | WEIGHT: 137 LBS

## 2021-06-23 DIAGNOSIS — L30.8 OTHER ECZEMA: ICD-10-CM

## 2021-06-23 DIAGNOSIS — N89.8 VAGINAL DISCHARGE: Primary | ICD-10-CM

## 2021-06-23 LAB
SPECIMEN SOURCE: NORMAL
WET PREP SPEC: NORMAL

## 2021-06-23 PROCEDURE — 87210 SMEAR WET MOUNT SALINE/INK: CPT | Performed by: FAMILY MEDICINE

## 2021-06-23 PROCEDURE — 99213 OFFICE O/P EST LOW 20 MIN: CPT | Performed by: FAMILY MEDICINE

## 2021-06-23 RX ORDER — FLUOCINOLONE ACETONIDE 0.1 MG/G
CREAM TOPICAL 2 TIMES DAILY
Qty: 15 G | Refills: 0 | Status: SHIPPED | OUTPATIENT
Start: 2021-06-23

## 2021-06-23 ASSESSMENT — ANXIETY QUESTIONNAIRES
6. BECOMING EASILY ANNOYED OR IRRITABLE: SEVERAL DAYS
2. NOT BEING ABLE TO STOP OR CONTROL WORRYING: MORE THAN HALF THE DAYS
3. WORRYING TOO MUCH ABOUT DIFFERENT THINGS: MORE THAN HALF THE DAYS
7. FEELING AFRAID AS IF SOMETHING AWFUL MIGHT HAPPEN: SEVERAL DAYS
5. BEING SO RESTLESS THAT IT IS HARD TO SIT STILL: NOT AT ALL
GAD7 TOTAL SCORE: 8
GAD7 TOTAL SCORE: 8
7. FEELING AFRAID AS IF SOMETHING AWFUL MIGHT HAPPEN: SEVERAL DAYS
1. FEELING NERVOUS, ANXIOUS, OR ON EDGE: MORE THAN HALF THE DAYS
GAD7 TOTAL SCORE: 8
4. TROUBLE RELAXING: NOT AT ALL

## 2021-06-23 ASSESSMENT — PATIENT HEALTH QUESTIONNAIRE - PHQ9
SUM OF ALL RESPONSES TO PHQ QUESTIONS 1-9: 1
10. IF YOU CHECKED OFF ANY PROBLEMS, HOW DIFFICULT HAVE THESE PROBLEMS MADE IT FOR YOU TO DO YOUR WORK, TAKE CARE OF THINGS AT HOME, OR GET ALONG WITH OTHER PEOPLE: NOT DIFFICULT AT ALL
SUM OF ALL RESPONSES TO PHQ QUESTIONS 1-9: 1

## 2021-06-23 NOTE — PROGRESS NOTES
Assessment & Plan     Vaginal discharge    - Wet prep-negative.  Patient notified    Other eczema    Patient has a patch of eczema on the right lateral foot, recommending to use steroid cream for the next few days till it settles down.  If symptoms are not improving, instructed to notify us back.  - fluocinolone (SYNALAR) 0.01 % external cream; Apply topically 2 times daily      Yaquelin Costa MD  [facility] FRANCES Rosen is a 30 year old who presents for the following health issues     HPI     Rash  Onset/Duration: 1 month  Description  Location: right foot  Character: red small bumps  Itching: mild  Intensity:  mild  Progression of Symptoms:  Improving has been using otc fungal  cream  Accompanying signs and symptoms:   Fever: no  Body aches or joint pain: no  Sore throat symptoms: no  Recent cold symptoms: no  History:           Previous episodes of similar rash: YES  New exposures:  None  Recent travel: no  Exposure to similar rash: no  Precipitating or alleviating factors: NA  Therapies tried and outcome: otc fungal cream    Vaginal Symptoms  Onset/Duration: states she has chronic bv/yeast infections  Description:  Vaginal Discharge: none   Itching (Pruritis): no  Burning sensation:  YES- slight  Odor: no  Accompanying Signs & Symptoms:  Urinary symptoms: no  Abdominal pain: no  Fever: no  History:   Sexually active: YES  New Partner: no  Possibility of Pregnancy:  no  Recent antibiotic use: no  Previous vaginitis issues: no  Precipitating or alleviating factors: None  Therapies tried and outcome: none        Review of Systems   CONSTITUTIONAL: NEGATIVE for fever, chills, change in weight  ENT/MOUTH: NEGATIVE for ear, mouth and throat problems  RESP: NEGATIVE for significant cough or SOB  CV: NEGATIVE for chest pain, palpitations or peripheral edema      Objective    /70   Pulse 96   Temp 99  F (37.2  C) (Tympanic)   Wt 62.1 kg (137 lb)   SpO2 94%   BMI 23.52  kg/m    Body mass index is 23.52 kg/m .  Physical Exam   GENERAL: healthy, alert and no distress  RESP: lungs clear to auscultation - no rales, rhonchi or wheezes  CV: regular rate and rhythm   (female): normal female external genitalia, normal urethral meatus, vaginal mucosa, normal cervix/adnexa/uterus without masses.  White discharge noted in the vaginal vault  SKIN: Erythematous dry skin noted on the lateral side of right foot.    Results for orders placed or performed in visit on 06/23/21   Wet prep     Status: None    Specimen: Midstream Urine   Result Value Ref Range    Specimen Description Midstream Urine     Wet Prep No Trichomonas seen     Wet Prep No clue cells seen     Wet Prep No yeast seen     Wet Prep Few  WBC'S seen

## 2021-06-24 ASSESSMENT — PATIENT HEALTH QUESTIONNAIRE - PHQ9: SUM OF ALL RESPONSES TO PHQ QUESTIONS 1-9: 1

## 2021-06-24 ASSESSMENT — ANXIETY QUESTIONNAIRES: GAD7 TOTAL SCORE: 8

## 2021-07-12 ENCOUNTER — TELEPHONE (OUTPATIENT)
Dept: PSYCHOLOGY | Facility: CLINIC | Age: 31
End: 2021-07-12

## 2021-07-12 NOTE — TELEPHONE ENCOUNTER
Scheduled follow-up on 7/15 in specified acute slot. Left VM prompting pt to call back if resxcheduled appt on 7/15 @ 8:00 with Dr. Ibarra doesn't fit their schedule. BM
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
## 2021-07-12 NOTE — CONFIDENTIAL NOTE
Absolutely pt can be seen sooner. Has permission to schedule in an ACUTE medication management f/u slot. Pt should also be scehduled with [facility] prior to my appt as usual.  I am more than happy to get [facility] paperwork filled out for pt and can discuss further details of type of leave at appointment.
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
## 2021-07-15 ENCOUNTER — VIRTUAL VISIT (OUTPATIENT)
Dept: PSYCHOLOGY | Facility: CLINIC | Age: 31
End: 2021-07-15
Payer: COMMERCIAL

## 2021-07-15 ENCOUNTER — VIRTUAL VISIT (OUTPATIENT)
Dept: PSYCHIATRY | Facility: CLINIC | Age: 31
End: 2021-07-15
Payer: COMMERCIAL

## 2021-07-15 DIAGNOSIS — F43.10 PTSD (POST-TRAUMATIC STRESS DISORDER): ICD-10-CM

## 2021-07-15 DIAGNOSIS — F41.1 GAD (GENERALIZED ANXIETY DISORDER): ICD-10-CM

## 2021-07-15 DIAGNOSIS — F39 MOOD DISORDER (H): ICD-10-CM

## 2021-07-15 DIAGNOSIS — G47.00 INSOMNIA, UNSPECIFIED TYPE: Primary | ICD-10-CM

## 2021-07-15 DIAGNOSIS — F41.1 GAD (GENERALIZED ANXIETY DISORDER): Primary | ICD-10-CM

## 2021-07-15 PROCEDURE — 90832 PSYTX W PT 30 MINUTES: CPT | Mod: TEL | Performed by: PSYCHOLOGIST

## 2021-07-15 PROCEDURE — 99214 OFFICE O/P EST MOD 30 MIN: CPT | Mod: GT | Performed by: PSYCHIATRY & NEUROLOGY

## 2021-07-15 RX ORDER — DOXEPIN HYDROCHLORIDE 10 MG/ML
5-10 SOLUTION ORAL AT BEDTIME
Qty: 118 ML | Refills: 0 | Status: SHIPPED | OUTPATIENT
Start: 2021-07-15 | End: 2021-09-15 | Stop reason: ALTCHOICE

## 2021-07-15 NOTE — PROGRESS NOTES
Collaborative Care Psychiatry Service (CCPS)  July 15, 2021      Behavioral Health Clinician Progress Note    Patient Name: Faustina Lubin      Telemedicine Visit: The patient's condition can be safely assessed and treated via synchronous audio and visual telemedicine encounter.      Reason for Telemedicine Visit: Services only offered telehealth    Originating Site (Patient Location): Patient's home    Distant Site (Provider Location): Provider Remote Setting- Home Office    Consent:  The patient/guardian has verbally consented to: the potential risks and benefits of telemedicine (video visit) versus in person care; bill my insurance or make self-payment for services provided; and responsibility for payment of non-covered services.     Mode of Communication:  Video Conference via Aditazz    As the provider I attest to compliance with applicable laws and regulations related to telemedicine.         Service Type:  Individual      Session Start Time:  08:00am Session End Time: 08:30am      Session Length: 16 - 37      Attendees: Client    Visit Activities (Refresh list every visit): [facility] Only    Diagnostic Assessment Date: 12/9/20 Marsha Newton, JEFFREY  See Flowsheets for today's PHQ-9 and VICKIE-7 results  Previous PHQ-9:   PHQ-9 SCORE 10/2/2020 6/1/2021 6/23/2021   PHQ-9 Total Score MyChart - 7 (Mild depression) 1 (Minimal depression)   PHQ-9 Total Score 18 7 1       Previous VICIKE-7:   VICKIE-7 SCORE 10/2/2020 6/1/2021 6/23/2021   Total Score - 4 (minimal anxiety) 8 (mild anxiety)   Total Score 20 4 8       WHODAS  WHODAS 2.0 Total Score 12/9/2020   Total Score 17        AUDIT  No flowsheet data found.    DATA  Extended Session (60+ minutes): No  Interactive Complexity: No  Crisis: No    Medication Compliance:  Yes      Chemical Use Review:   Substance Use: Chemical use reviewed, no active concerns identified      Tobacco Use: No current tobacco use.      Current Stressors / Issues:  Still having difficulty  "with sleep. \"I'm pretty tired.\" She reported that she sleeps 12-14 hours on days off and 6-8 hours when needing to go to work. She has difficulty getting out of bed but once she starts moving she feels fine. She does not feel fatigued throughout the rest of the day. She reported that her mood is stable and had no other concerns. She continues to meet with her therapist.      Review of Symptoms per patient report:  Depression: Change in sleep, Lack of interest, Excessive or inappropriate guilt, Change in energy level and Feeling sad, down, or depressed  Arabella:  No Symptoms  Psychosis: No Symptoms  Anxiety: Excessive worry, Nervousness, Sleep disturbance, Ruminations, Poor concentration and Irritability  Panic:  No symptoms  Post Traumatic Stress Disorder:  No Symptoms   Eating Disorder: No Symptoms  ADD / ADHD:  No symptoms  Conduct Disorder: No symptoms  Autism Spectrum Disorder: No symptoms  Obsessive Compulsive Disorder: Obsessions      Changes in Health Issues:   None reported    Assessment: Current Emotional / Mental Status (status of significant symptoms):  Risk status (Self / Other harm or suicidal ideation)  Patient denies a history of suicidal ideation, suicide attempts, self-injurious behavior, homicidal ideation, homicidal behavior and and other safety concerns  Patient denies current fears or concerns for personal safety.  Patient denies current or recent suicidal ideation or behaviors.  Patient denies current or recent homicidal ideation or behaviors.  Patient denies current or recent self injurious behavior or ideation.  Patient denies other safety concerns.  A safety and risk management plan has not been developed at this time, however patient was encouraged to call [place] [phone] should there be a change in any of these risk factors.    Appearance:   Unable to assess over the phone   Eye Contact:   Unable to assess over the phone   Psychomotor Behavior: Unable to assess over the phone "   Attitude:   Cooperative   Orientation:   All  Speech   Rate / Production: Slow    Volume:  Soft   Mood:    Normal  Affect:    Unable to assess over the phone   Thought Content:  Clear   Thought Form:  Coherent  Logical   Insight:    Fair     Diagnoses:  1. VICKIE (generalized anxiety disorder)    2. Mood disorder (H)    3. PTSD (post-traumatic stress disorder)        Collateral Reports Completed:  Communicated with: Dr. Ibarra    Plan: (Homework, other):  Patient was given information about behavioral services and encouraged to schedule a follow up appointment with the clinic [facility] in conjunction with next CCPS appointment.  She was also given information about mental health symptoms and treatment options .  CD Recommendations: No indications of CD issues.      Arsalan Macias PsyD, LP      Sarthak Macias PsyD  July 15, 2021

## 2021-07-15 NOTE — PATIENT INSTRUCTIONS
Treatment Plan:    Discontinue mirtazapine     Start trial with doxepin at bedtime for sleep. Start with 5-10 mg per solution bottle instructions. Can always trial taking less to see if that helps decrease morning time sedation.     If doxepin not helpful and/or not tolerated, discontinue and return to using mirtazapine 15 mg at bedtime.     Continue hydroxyzine 25-50 mg up to three times a day as needed for anxiety/sleep. Can break a tablet in half and take just 12.5 mg if 25 mg too sedating.     I will complete your FMLA forms. I need a MICHELLE with your signature and also the information for where to send completed paperwork. My direct fax # is [phone].     Continue all other medications as reviewed per electronic medical record today.     Safety plan reviewed. To the Emergency Department as needed or call after hours crisis line at [phone] or [phone]. [place] Crisis Text Line. Text [place] to 321726 or Suicide LifeLine Chat: suicidepreventionlifeline.org/chat    Continue therapy as planned. Consider increasing sessions to weekly during this more difficult time.    Schedule an appointment with me in 4 weeks or sooner as needed. Call [facility] at [phone] to schedule if someone does not reach out to you within 2-3 days.     Follow up with primary care provider as planned or for acute medical concerns.    Call the psychiatric nurse line with medication questions or concerns at [phone].    Reffpediahart may be used to communicate with your provider, but this is not intended to be used for emergencies.

## 2021-07-15 NOTE — Clinical Note
Please call this patient to get them scheduled for a follow-up visit in 4 weeks. Please schedule with me and the [facility]. Thanks!
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
## 2021-07-15 NOTE — Clinical Note
Just FYI. No action needed.     Madeleine Ibarra, DO  Collaborative Care Psychiatry  [facility]

## 2021-07-19 ENCOUNTER — MYC MEDICAL ADVICE (OUTPATIENT)
Dept: PSYCHIATRY | Facility: CLINIC | Age: 31
End: 2021-07-19

## 2021-07-22 ENCOUNTER — MYC MEDICAL ADVICE (OUTPATIENT)
Dept: PSYCHIATRY | Facility: CLINIC | Age: 31
End: 2021-07-22

## 2021-07-22 NOTE — TELEPHONE ENCOUNTER
See patient's Trion Worldshart message. Last note stated:    Start trial with doxepin at bedtime for sleep. Start with 5-10 mg per solution bottle instructions. Can always trial taking less to see if that helps decrease morning time sedation    Script:  Outpatient Medication Detail     Disp Refills Start End DYLLAN   doxepin (SINEQUAN) 10 MG/ML (HIGH CONC) solution 118 mL 0 7/15/2021  --   Sig - Route: Take 0.5-1 mLs (5-10 mg) by mouth At Bedtime - Oral   Sent to pharmacy as: Doxepin HCl 10 MG/ML Oral Concentrate (SINEquan)   Class: E-Prescribe   Notes to Pharmacy: Stopping mirtazapine   Order: [id number]   E-Prescribing Status: Receipt confirmed by pharmacy (7/15/2021  9:02 AM CDT

## 2021-08-17 NOTE — TELEPHONE ENCOUNTER
"Refill request r'cd from [facility] via fax for Mirtazipine 15mg denied due to d/c'ed on 7/15/21 - requested they deactivate order. Faxed \"not authorized\" back to pharmacy.    Vicente Ghosh RN on 8/17/2021 at 10:12 AM    "

## 2021-08-19 ENCOUNTER — OFFICE VISIT (OUTPATIENT)
Dept: FAMILY MEDICINE | Facility: CLINIC | Age: 31
End: 2021-08-19
Payer: COMMERCIAL

## 2021-08-19 VITALS
TEMPERATURE: 99.1 F | DIASTOLIC BLOOD PRESSURE: 72 MMHG | BODY MASS INDEX: 23.73 KG/M2 | WEIGHT: 139 LBS | HEART RATE: 82 BPM | OXYGEN SATURATION: 100 % | HEIGHT: 64 IN | SYSTOLIC BLOOD PRESSURE: 100 MMHG

## 2021-08-19 DIAGNOSIS — N94.9 VAGINAL SYMPTOM: Primary | ICD-10-CM

## 2021-08-19 DIAGNOSIS — L30.1 DYSHIDROTIC FOOT DERMATITIS: ICD-10-CM

## 2021-08-19 DIAGNOSIS — Z30.41 ENCOUNTER FOR BIRTH CONTROL PILLS MAINTENANCE: ICD-10-CM

## 2021-08-19 LAB
CLUE CELLS: NORMAL
HCG UR QL: NEGATIVE
TRICHOMONAS, WET PREP: NORMAL
WBC'S/HIGH POWER FIELD, WET PREP: NORMAL
YEAST, WET PREP: NORMAL

## 2021-08-19 PROCEDURE — 99214 OFFICE O/P EST MOD 30 MIN: CPT | Performed by: NURSE PRACTITIONER

## 2021-08-19 PROCEDURE — 87210 SMEAR WET MOUNT SALINE/INK: CPT | Performed by: NURSE PRACTITIONER

## 2021-08-19 PROCEDURE — 81025 URINE PREGNANCY TEST: CPT | Performed by: NURSE PRACTITIONER

## 2021-08-19 RX ORDER — LEVONORGESTREL/ETHIN.ESTRADIOL 0.1-0.02MG
1 TABLET ORAL DAILY
Qty: 84 TABLET | Refills: 3 | Status: SHIPPED | OUTPATIENT
Start: 2021-08-19

## 2021-08-19 RX ORDER — TRIAMCINOLONE ACETONIDE 5 MG/G
1 OINTMENT TOPICAL 2 TIMES DAILY
Qty: 15 G | Refills: 3 | Status: SHIPPED | OUTPATIENT
Start: 2021-08-19

## 2021-08-19 ASSESSMENT — MIFFLIN-ST. JEOR: SCORE: 1340.5

## 2021-08-27 ENCOUNTER — MYC MEDICAL ADVICE (OUTPATIENT)
Dept: PSYCHIATRY | Facility: CLINIC | Age: 31
End: 2021-08-27

## 2021-08-27 NOTE — TELEPHONE ENCOUNTER
Per provider directive, this RN updated pt of information she provided.    Madeleine Ibarra, DO 2 minutes ago (10:21 AM)   AB     Pt can wait. Thanks.       Unsigned   Documentation

## 2021-09-15 ENCOUNTER — VIRTUAL VISIT (OUTPATIENT)
Dept: PSYCHIATRY | Facility: CLINIC | Age: 31
End: 2021-09-15
Payer: COMMERCIAL

## 2021-09-15 ENCOUNTER — VIRTUAL VISIT (OUTPATIENT)
Dept: PSYCHOLOGY | Facility: CLINIC | Age: 31
End: 2021-09-15
Payer: COMMERCIAL

## 2021-09-15 DIAGNOSIS — F43.10 PTSD (POST-TRAUMATIC STRESS DISORDER): ICD-10-CM

## 2021-09-15 DIAGNOSIS — F41.1 GAD (GENERALIZED ANXIETY DISORDER): ICD-10-CM

## 2021-09-15 DIAGNOSIS — F43.10 PTSD (POST-TRAUMATIC STRESS DISORDER): Primary | ICD-10-CM

## 2021-09-15 DIAGNOSIS — G47.00 INSOMNIA, UNSPECIFIED TYPE: ICD-10-CM

## 2021-09-15 DIAGNOSIS — F41.1 GAD (GENERALIZED ANXIETY DISORDER): Primary | ICD-10-CM

## 2021-09-15 DIAGNOSIS — F39 MOOD DISORDER (H): ICD-10-CM

## 2021-09-15 PROCEDURE — 90832 PSYTX W PT 30 MINUTES: CPT | Mod: GT | Performed by: PSYCHOLOGIST

## 2021-09-15 PROCEDURE — 99214 OFFICE O/P EST MOD 30 MIN: CPT | Mod: GT | Performed by: PSYCHIATRY & NEUROLOGY

## 2021-09-15 RX ORDER — MIRTAZAPINE 15 MG/1
15 TABLET, FILM COATED ORAL AT BEDTIME
Qty: 90 TABLET | Refills: 0 | Status: SHIPPED | OUTPATIENT
Start: 2021-09-15 | End: 2021-11-18

## 2021-09-15 NOTE — Clinical Note
Please call this patient to get them scheduled for a follow-up visit in 2 months. Please schedule with me and the [facility], Dr. Macais. Thanks!

## 2021-09-15 NOTE — PROGRESS NOTES
"Collaborative Care Psychiatry Service (CCPS)  September 15, 2021      Behavioral Health Clinician Progress Note    Patient Name: Faustina Lubni      Telemedicine Visit: The patient's condition can be safely assessed and treated via synchronous audio and visual telemedicine encounter.      Reason for Telemedicine Visit: Services only offered telehealth    Originating Site (Patient Location): Patient's home    Distant Site (Provider Location): Provider Remote Setting- Home Office    Consent:  The patient/guardian has verbally consented to: the potential risks and benefits of telemedicine (video visit) versus in person care; bill my insurance or make self-payment for services provided; and responsibility for payment of non-covered services.     Mode of Communication:  Video Conference via [employer]    As the provider I attest to compliance with applicable laws and regulations related to telemedicine.         Service Type:  Individual      Session Start Time:  12:30pm Session End Time: 12:50pm      Session Length: 16 - 37      Attendees: Client     Visit Activities (Refresh list every visit): [facility] Only    Diagnostic Assessment Date: 12/9/20 Marsha Newton LICSW  See Flowsheets for today's PHQ-9 and VICKIE-7 results  Previous PHQ-9:   PHQ-9 SCORE 10/2/2020 6/1/2021 6/23/2021   PHQ-9 Total Score MyChart - 7 (Mild depression) 1 (Minimal depression)   PHQ-9 Total Score 18 7 1       Previous VICKIE-7:   VICKIE-7 SCORE 10/2/2020 6/1/2021 6/23/2021   Total Score - 4 (minimal anxiety) 8 (mild anxiety)   Total Score 20 4 8       WHODAS  WHODAS 2.0 Total Score 12/9/2020   Total Score 17        AUDIT  No flowsheet data found.    DATA  Extended Session (60+ minutes): No  Interactive Complexity: No  Crisis: No    Medication Compliance:  Yes      Chemical Use Review:   Substance Use: Chemical use reviewed, no active concerns identified      Tobacco Use: No current tobacco use.      Current Stressors / Issues:  \"Things are going " "pretty good.\" Feels that things are going relatively well. She did not start the doxepin and is still taking mirtazapine. She spoke about having concern that the doxepin would have similar side effects as sertraline. She was busy with work and had a few weddings as well. She said that she is now sleeping 8-10 hours instead of 12-14. She feels she has been handling regular stressors fairly well.      Progress on Treatment Objective(s) / Homework:  Satisfactory progress - ACTION (Actively working towards change); Intervened by reinforcing change plan / affirming steps taken    Motivational Interviewing    MI Intervention: Expressed Empathy/Understanding, Supported Autonomy, Collaboration, Evocation, Permission to raise concern or advise, Open-ended questions, Reflections: simple and complex and Reframe     Change Talk Expressed by the Patient: Activation Taking steps    Provider Response to Change Talk: E - Evoked more info from patient about behavior change, A - Affirmed patient's thoughts, decisions, or attempts at behavior change, R - Reflected patient's change talk and S - Summarized patient's change talk statements    Also provided psychoeducation about behavioral health condition, symptoms, and treatment options      Review of Symptoms per patient report:  Depression: Change in sleep, Lack of interest, Excessive or inappropriate guilt, Change in energy level and Feeling sad, down, or depressed  Arabella:  No Symptoms  Psychosis: No Symptoms  Anxiety: Excessive worry, Nervousness, Sleep disturbance, Ruminations, Poor concentration and Irritability  Panic:  No symptoms  Post Traumatic Stress Disorder:  No Symptoms   Eating Disorder: No Symptoms  ADD / ADHD:  No symptoms  Conduct Disorder: No symptoms  Autism Spectrum Disorder: No symptoms  Obsessive Compulsive Disorder: Obsessions      Changes in Health Issues:   None reported    Assessment: Current Emotional / Mental Status (status of significant symptoms):  Risk " "status (Self / Other harm or suicidal ideation)  Patient denies a history of suicidal ideation, suicide attempts, self-injurious behavior, homicidal ideation, homicidal behavior and and other safety concerns  Patient denies current fears or concerns for personal safety.  Patient denies current or recent suicidal ideation or behaviors.  Patient denies current or recent homicidal ideation or behaviors.  Patient denies current or recent self injurious behavior or ideation.  Patient denies other safety concerns.  A safety and risk management plan has not been developed at this time, however patient was encouraged to call [place] [phone] should there be a change in any of these risk factors.    Appearance:   Appropriate   Eye Contact:   Good   Psychomotor Behavior: Normal   Attitude:   Cooperative   Orientation:   All  Speech   Rate / Production: Normal/ Responsive   Volume:  Soft   Mood:    Normal \"happy, more motivated.\"  Affect:    Appropriate   Thought Content:  Clear   Thought Form:  Coherent  Logical   Insight:    Fair     Diagnoses:  1. VICKIE (generalized anxiety disorder)    2. PTSD (post-traumatic stress disorder)    3. Mood disorder (H)        Collateral Reports Completed:  Communicated with: Dr. Ibarra    Plan: (Homework, other):  Patient was given information about behavioral services and encouraged to schedule a follow up appointment with the clinic [facility]  She was also given information about mental health symptoms and treatment options .  CD Recommendations: No indications of CD issues.      Arsalan Macias PsyD, LP      Sarthak Macias PsyD  September 15, 2021  "
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
## 2021-09-15 NOTE — PATIENT INSTRUCTIONS
Treatment Plan:    Discontinue doxepin since not taking    Discontinue hydroxyzine since not taking     Continue mirtazapine 15 mg at bedtime for mood, anxiety, sleep.     Continue all other medications as reviewed per electronic medical record today.     Safety plan reviewed. To the Emergency Department as needed or call after hours crisis line at [phone] or [phone]. [place] Crisis Text Line. Text [place] to 125646 or Suicide LifeLine Chat: suicidepreventionSageCloudline.org/chat    Continue therapy as planned.     Schedule an appointment with me in 2 months or sooner as needed. Call [facility] at [phone] to schedule if someone does not reach out to you within 2-3 days.     Follow up with primary care provider as planned or for acute medical concerns.    Call the psychiatric nurse line with medication questions or concerns at [phone].    [employer] may be used to communicate with your provider, but this is not intended to be used for emergencies.

## 2021-09-19 ENCOUNTER — HEALTH MAINTENANCE LETTER (OUTPATIENT)
Age: 31
End: 2021-09-19

## 2021-10-03 ENCOUNTER — NURSE TRIAGE (OUTPATIENT)
Dept: NURSING | Facility: CLINIC | Age: 31
End: 2021-10-03

## 2021-10-03 NOTE — TELEPHONE ENCOUNTER
Triage note:    Patient called to report stool maroon and brown. Patient states abdominal pain below the belly button. Patient states abdominal pain is not worrying her but her stool color is,     Per protocol patient to see PCP within 2 to three days. Given home care advice per protocol and when to call back.Patient verbalized understanding and agrees to plan of care.    Nat Gomez RN   10/03/21 2:43 AM  [facility] Nurse Advisor    COVID 19 Nurse Triage Plan/Patient Instructions    Please be aware that novel coronavirus (COVID-19) may be circulating in the community. If you develop symptoms such as fever, cough, or SOB or if you have concerns about the presence of another infection including coronavirus (COVID-19), please contact your health care provider or visit [email or web address].     Disposition/Instructions    Virtual Visit with provider recommended. Reference Visit Selection Guide.    Thank you for taking steps to prevent the spread of this virus.  o Limit your contact with others.  o Wear a simple mask to cover your cough.  o Wash your hands well and often.    Resources    [facility]: About COVID-19: [email or web address]    CDC: What to Do If You're Sick: www.cdc.gov/coronavirus/2019-ncov/about/steps-when-sick.html    CDC: Ending Home Isolation: www.cdc.gov/coronavirus/2019-ncov/hcp/disposition-in-home-patients.html     CDC: Caring for Someone: www.cdc.gov/coronavirus/2019-ncov/if-you-are-sick/care-for-someone.html     [facility]: Interim Guidance for Hospital Discharge to Home: www.health.[facility].[place]/diseases/coronavirus/hcp/hospdischarge.pdf    [facility] clinical trials (COVID-19 research studies): [email or web address]/um-[email or web address]     Below are the COVID-19 hotlines at the [place] Department of Health ([facility]). Interpreters are available.   o For health questions: Call 814-034-6652 or 1-498.403.2215 (7 a.m. to 7 p.m.)  o For questions about schools  "and childcare: Call 370-125-2266 or 1-918.161.1507 (7 a.m. to 7 p.m.)                     Reason for Disposition    [1] Abnormal color is unexplained AND [2] persists > 24 hours    Abdominal pain    Pain is mainly in upper abdomen  (if needed ask: \"is it mainly above the belly button?\")    Additional Information    Negative: Rectal bleeding, bloody stools, or blood in stool (bowel movement)    Negative: Tarry or jet black-colored stool    Negative: [1] Stool color is black (not dark green) AND [2] patient hasn't swallowed substance that causes black stools (e.g., Pepto-Bismol, iron pills)    Negative: Diarrhea stools (i.e., watery stools, or increase in the frequency and looseness of stools)    Negative: [1] Abdomen pain is main symptom AND [2] male    Negative: Shock suspected (e.g., cold/pale/clammy skin, too weak to stand, low BP, rapid pulse)    Negative: Difficult to awaken or acting confused (e.g., disoriented, slurred speech)    Negative: Passed out (i.e., lost consciousness, collapsed and was not responding)    Negative: Sounds like a life-threatening emergency to the triager    Negative: Chest pain    Negative: Followed an abdomen (stomach) injury    Negative: [1] Abdominal pain AND [2] pregnant < 20 weeks    Negative: [1] Abdominal pain AND [2] pregnant > 20 weeks    Negative: [1] Abdominal pain AND [2] postpartum (from 0 to 6 weeks after delivery)    Negative: [1] SEVERE pain (e.g., excruciating) AND [2] present > 1 hour    Negative: [1] SEVERE pain AND [2] age > 60    Negative: [1] Vomiting AND [2] contains red blood or black (\"coffee ground\") material  (Exception: few red streaks in vomit that only happened once)    Negative: Blood in bowel movements   (Exception: blood on surface of BM with constipation)    Negative: Black or tarry bowel movements  (Exception: chronic-unchanged  black-grey bowel movements AND is taking iron pills or Pepto-bismol)    Negative: Patient sounds very sick or weak to the " triager    Negative: [1] MILD-MODERATE pain AND [2] constant AND [3] present > 2 hours    Negative: [1] Vomiting AND [2] abdomen looks much more swollen than usual    Negative: [1] Vomiting AND [2] contains bile (green color)    Negative: White of the eyes have turned yellow (i.e., jaundice)    Negative: Fever > 103 F (39.4 C)    Negative: [1] Fever > 101 F (38.3 C) AND [2] age > 60    Negative: [1] Fever > 100.0 F (37.8 C) AND [2] bedridden (e.g., nursing home patient, CVA, chronic illness, recovering from surgery)    Negative: [1] Fever > 100.0 F (37.8 C) AND [2] diabetes mellitus or weak immune system (e.g., HIV positive, cancer chemo, splenectomy, organ transplant, chronic steroids)    Negative: [1] MODERATE pain (e.g., interferes with normal activities) AND [2] pain comes and goes (cramps) AND [3] present > 24 hours  (Exception: pain with Vomiting or Diarrhea - see that Guideline)    Negative: Age > 60 years    Negative: Patient sounds very sick or weak to the triager    Negative: [1] Stool is light gray or whitish AND [2] unexplained    Negative: [1] Stool is mucus or pus AND [2] persists > 24 hours    Negative: [1] Abdomen pain is main symptom AND [2] adult female    Negative: Yellow eyes (jaundice)    Negative: Severe difficulty breathing (e.g., struggling for each breath, speaks in single words)    Negative: Shock suspected (e.g., cold/pale/clammy skin, too weak to stand, low BP, rapid pulse)    Negative: Difficult to awaken or acting confused (e.g., disoriented, slurred speech)    Negative: Passed out (i.e., lost consciousness, collapsed and was not responding)    Negative: Visible sweat on face or sweat dripping down face    Negative: Sounds like a life-threatening emergency to the triager    Negative: Followed an abdomen (stomach) injury    Negative: Chest pain    Negative: [1] SEVERE pain (e.g., excruciating) AND [2] present > 1 hour    Negative: [1] Pain lasts > 10 minutes AND [2] age > 50    Negative:  "[1] Pain lasts > 10 minutes AND [2] age > 40 AND [3] associated chest, arm, neck, upper back or jaw pain    Negative: [1] Pain lasts > 10 minutes AND [2] age > 35 AND [3] at least one cardiac risk factor (i.e., hypertension, diabetes, obesity, smoker or strong family history of heart disease)    Negative: [1] Pain lasts > 10 minutes AND [2] history of heart disease (i.e., heart attack, bypass surgery, angina, angioplasty, CHF; not just a heart murmur)    Negative: [1] Pain lasts > 10 minutes AND [2] difficulty breathing    Negative: [1] Vomiting AND [2] contains red blood  (Exception: few streaks and only occurred once)    Negative: [1] Vomiting AND [2] contains black (\"coffee ground\") material    Negative: Blood in bowel movements  (Exception: Blood on surface of BM with constipation)    Negative: Black or tarry bowel movements  (Exception: chronic-unchanged  black-grey bowel movements AND is taking iron pills or Pepto-bismol)    Negative: [1] Pregnant > 24 weeks AND [2] hand or face swelling    Negative: Patient sounds very sick or weak to the triager    Negative: [1] MILD-MODERATE pain AND [2] constant AND [3] present > 2 hours    Negative: [1] MILD-MODERATE pain AND [2] not relieved by antacids    Negative: [1] Vomiting AND [2] contains bile (green color)    Negative: [1] Vomiting AND [2] abdomen looks much more swollen than usual    Negative: White of the eyes have turned yellow (i.e., jaundice)    Negative: Fever > 103 F (39.4 C)    Negative: [1] Fever > 101 F (38.3 C) AND [2] age > 60    Negative: [1] Fever > 100.0 F (37.8 C) AND [2] bedridden (e.g., nursing home patient, CVA, chronic illness, recovering from surgery)    Negative: [1] Fever > 100.0 F (37.8 C) AND [2] diabetes mellitus or weak immune system (e.g., HIV positive, cancer chemo, splenectomy, organ transplant, chronic steroids)    Negative: [1] MILD pain (e.g., does not interfere with normal activities) AND [2] comes and goes (cramps) AND [3] " present > 72 hours    Negative: [1] MODERATE pain (e.g., interferes with normal activities) AND [2] comes and goes (cramps) AND [3] present > 24 hours  (Exception: pain with Vomiting or Diarrhea - see that Guideline)    Negative: Alcohol abuse known or suspected    Negative: [1] Abdominal pain is intermittent AND [2] shoots into chest, with sour taste in mouth    Negative: Abdominal pain is a chronic symptom (recurrent or ongoing AND present > 4 weeks)    Negative: Abdominal pains regularly occur about 1 hour after meals    Negative: [1] MILD pain (e.g., does not interfere with normal activities) AND [2] pain comes and goes (cramps) AND [3] present > 48 hours    Negative: [1] SEVERE pain AND [2] present < 1 hour    Protocols used: STOOLS - UNUSUAL COLOR-A-AH, ABDOMINAL PAIN - UPPER-A-AH, ABDOMINAL PAIN - FEMALE-A-AH

## 2021-11-18 ENCOUNTER — VIRTUAL VISIT (OUTPATIENT)
Dept: PSYCHIATRY | Facility: CLINIC | Age: 31
End: 2021-11-18
Payer: COMMERCIAL

## 2021-11-18 ENCOUNTER — VIRTUAL VISIT (OUTPATIENT)
Dept: PSYCHOLOGY | Facility: CLINIC | Age: 31
End: 2021-11-18
Payer: COMMERCIAL

## 2021-11-18 DIAGNOSIS — F41.9 ANXIETY: Primary | ICD-10-CM

## 2021-11-18 DIAGNOSIS — F43.10 PTSD (POST-TRAUMATIC STRESS DISORDER): Primary | ICD-10-CM

## 2021-11-18 DIAGNOSIS — F41.1 GAD (GENERALIZED ANXIETY DISORDER): ICD-10-CM

## 2021-11-18 DIAGNOSIS — F39 MOOD DISORDER (H): ICD-10-CM

## 2021-11-18 DIAGNOSIS — F43.10 PTSD (POST-TRAUMATIC STRESS DISORDER): ICD-10-CM

## 2021-11-18 DIAGNOSIS — G47.00 INSOMNIA, UNSPECIFIED TYPE: ICD-10-CM

## 2021-11-18 PROCEDURE — 99214 OFFICE O/P EST MOD 30 MIN: CPT | Mod: GT | Performed by: PSYCHIATRY & NEUROLOGY

## 2021-11-18 PROCEDURE — 90832 PSYTX W PT 30 MINUTES: CPT | Mod: GT | Performed by: PSYCHOLOGIST

## 2021-11-18 RX ORDER — MIRTAZAPINE 15 MG/1
15 TABLET, FILM COATED ORAL AT BEDTIME
Qty: 90 TABLET | Refills: 0 | Status: SHIPPED | OUTPATIENT
Start: 2021-11-18

## 2021-11-18 NOTE — PROGRESS NOTES
Collaborative Care Psychiatry Service (CCPS)  November 18, 2021      Behavioral Health Clinician Progress Note    Patient Name: Faustina Lubin      Telemedicine Visit: The patient's condition can be safely assessed and treated via synchronous audio and visual telemedicine encounter.      Reason for Telemedicine Visit: Services only offered telehealth    Originating Site (Patient Location): Patient's home    Distant Site (Provider Location): Provider Remote Setting- Home Office    Consent:  The patient/guardian has verbally consented to: the potential risks and benefits of telemedicine (video visit) versus in person care; bill my insurance or make self-payment for services provided; and responsibility for payment of non-covered services.     Mode of Communication:  Video Conference via [employer]    As the provider I attest to compliance with applicable laws and regulations related to telemedicine.         Service Type:  Individual      Session Start Time:  07:30am Session End Time: 07:50am      Session Length: 16 - 37      Attendees: Client     Visit Activities (Refresh list every visit): [facility] Only    Diagnostic Assessment Date: 12/9/20 Marsha Newton, JEFFREY  See Flowsheets for today's PHQ-9 and VICKIE-7 results  Previous PHQ-9:   PHQ-9 SCORE 10/2/2020 6/1/2021 6/23/2021   PHQ-9 Total Score MyChart - 7 (Mild depression) 1 (Minimal depression)   PHQ-9 Total Score 18 7 1       Previous VICKIE-7:   VICKIE-7 SCORE 10/2/2020 6/1/2021 6/23/2021   Total Score - 4 (minimal anxiety) 8 (mild anxiety)   Total Score 20 4 8       WHODAS  WHODAS 2.0 Total Score 12/9/2020   Total Score 17        AUDIT  No flowsheet data found.    DATA  Extended Session (60+ minutes): No  Interactive Complexity: No  Crisis: No    Medication Compliance:  Yes      Chemical Use Review:   Substance Use: Chemical use reviewed, no active concerns identified      Tobacco Use: No current tobacco use.      Current Stressors / Issues:  Reported she started  working overnights again working 12hr shifts that sometimes are 16hrs long. She feels that her medications are in a good place for her. She takes a Vitamin D gummy and her cousin sells [employer] products and wants her to switch to their brand. She would like to know if there are any concerns with doing this.      Progress on Treatment Objective(s) / Homework:  Satisfactory progress - ACTION (Actively working towards change); Intervened by reinforcing change plan / affirming steps taken    Motivational Interviewing    MI Intervention: Expressed Empathy/Understanding, Supported Autonomy, Collaboration, Evocation, Permission to raise concern or advise, Open-ended questions, Reflections: simple and complex and Reframe     Change Talk Expressed by the Patient: Activation Taking steps    Provider Response to Change Talk: E - Evoked more info from patient about behavior change, A - Affirmed patient's thoughts, decisions, or attempts at behavior change, R - Reflected patient's change talk and S - Summarized patient's change talk statements    Also provided psychoeducation about behavioral health condition, symptoms, and treatment options      Review of Symptoms per patient report:  Depression: Change in sleep, Lack of interest, Excessive or inappropriate guilt, Change in energy level and Feeling sad, down, or depressed  [place]ella:  No Symptoms  Psychosis: No Symptoms  Anxiety: Excessive worry, Nervousness, Sleep disturbance, Ruminations, Poor concentration and Irritability  Panic:  No symptoms  Post Traumatic Stress Disorder:  No Symptoms   Eating Disorder: No Symptoms  ADD / ADHD:  No symptoms  Conduct Disorder: No symptoms  Autism Spectrum Disorder: No symptoms  Obsessive Compulsive Disorder: Obsessions      Changes in Health Issues:   None reported    Assessment: Current Emotional / Mental Status (status of significant symptoms):  Risk status (Self / Other harm or suicidal ideation)  Patient denies a history of suicidal  "ideation, suicide attempts, self-injurious behavior, homicidal ideation, homicidal behavior and and other safety concerns  Patient denies current fears or concerns for personal safety.  Patient denies current or recent suicidal ideation or behaviors.  Patient denies current or recent homicidal ideation or behaviors.  Patient denies current or recent self injurious behavior or ideation.  Patient denies other safety concerns.  A safety and risk management plan has not been developed at this time, however patient was encouraged to call [place] [phone] should there be a change in any of these risk factors.    Appearance:   Appropriate   Eye Contact:   Good   Psychomotor Behavior: Normal   Attitude:   Cooperative   Orientation:   All  Speech   Rate / Production: Normal/ Responsive   Volume:  Soft   Mood:    Normal \"happy, more motivated.\"  Affect:    Appropriate   Thought Content:  Clear   Thought Form:  Coherent  Logical   Insight:    Fair     Diagnoses:  1. Anxiety    2. Mood disorder (H)    3. PTSD (post-traumatic stress disorder)        Collateral Reports Completed:  Communicated with: Dr. Ibarra    Plan: (Homework, other):  Patient was given information about behavioral services and encouraged to schedule a follow up appointment with the clinic [facility].  She was also given information about mental health symptoms and treatment options .  CD Recommendations: No indications of CD issues.      Arsalan Macias PsyD, LP      Sarthak Macias PsyD  November 18, 2021  "

## 2021-11-18 NOTE — PATIENT INSTRUCTIONS
Treatment Plan:    Continue mirtazapine 15 mg at bedtime for mood, anxiety, sleep.     Continue all other medications as reviewed per electronic medical record today.     Safety plan reviewed. To the Emergency Department as needed or call after hours crisis line at [phone] or [phone]. [place] Crisis Text Line. Text [place] to 908368 or Suicide LifeLine Chat: suicideprevention[place].org/chat    Continue therapy as planned.     Follow up with primary care provider in 2-3 months for mental health visit, as planned, or for acute medical concerns.    [employer] may be used to communicate with your provider, but this is not intended to be used for emergencies.    Thank you for our work together in the Psychiatry Collaborative Care Model at [facility]. This is our last visit and I am returning your care back to your Primary Care Provider Analy Monzon MD . If you are not doing well, please contact your Primary Care Provider office.
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
## 2022-03-05 ENCOUNTER — HEALTH MAINTENANCE LETTER (OUTPATIENT)
Age: 32
End: 2022-03-05

## 2022-11-20 ENCOUNTER — HEALTH MAINTENANCE LETTER (OUTPATIENT)
Age: 32
End: 2022-11-20

## 2023-02-28 ENCOUNTER — MYC MEDICAL ADVICE (OUTPATIENT)
Dept: PSYCHIATRY | Facility: CLINIC | Age: 33
End: 2023-02-28
Payer: COMMERCIAL

## 2023-11-25 ENCOUNTER — HEALTH MAINTENANCE LETTER (OUTPATIENT)
Age: 33
End: 2023-11-25

## 2025-05-19 NOTE — PATIENT INSTRUCTIONS
Treatment Plan:    Continue mirtazapine 22.5 mg at bedtime for mood, anxiety, and sleep    Continue hydroxyzine 25-50 mg up to three times a day as needed for anxiety/sleep. Can break a tablet in half and take just 12.5 mg if 25 mg too sedating.     Continue all other medications as reviewed per electronic medical record today.     Safety plan reviewed. To the Emergency Department as needed or call after hours crisis line at [phone] or [phone]. [place] Crisis Text Line. Text [place] to 526825 or Suicide LifeLine Chat: [email or web address].[email or web address]/chat    Continue therapy as planned.     Schedule an appointment with me in 3 months or sooner as needed. Call [facility] at [phone] to schedule if someone does not reach out to you within 2-3 days.     Follow up with primary care provider as planned or for acute medical concerns.    Call the psychiatric nurse line with medication questions or concerns at [phone].    [employer] may be used to communicate with your provider, but this is not intended to be used for emergencies.    [facility] Resource/webiste with supplement recommendations for anxiety:  https://[email or web address]/diseases-conditions/generalized-anxiety-disorder/expert-answers/herbal-[email or web address]-anxiety/faq-62285203   99